# Patient Record
Sex: FEMALE | Race: BLACK OR AFRICAN AMERICAN | Employment: OTHER | URBAN - METROPOLITAN AREA
[De-identification: names, ages, dates, MRNs, and addresses within clinical notes are randomized per-mention and may not be internally consistent; named-entity substitution may affect disease eponyms.]

---

## 2017-06-20 ENCOUNTER — HISTORICAL (OUTPATIENT)
Dept: ADMINISTRATIVE | Facility: HOSPITAL | Age: 62
End: 2017-06-20

## 2017-06-20 LAB
ABS NEUT (OLG): 2.91 X10(3)/MCL (ref 2.1–9.2)
ALBUMIN SERPL-MCNC: 4 GM/DL (ref 3.4–5)
ALBUMIN/GLOB SERPL: 1 {RATIO}
ALP SERPL-CCNC: 66 UNIT/L (ref 20–120)
ALT SERPL-CCNC: 16 UNIT/L
AST SERPL-CCNC: 19 UNIT/L
BASOPHILS # BLD AUTO: 0.04 X10(3)/MCL
BASOPHILS NFR BLD AUTO: 1 % (ref 0–1)
BILIRUB SERPL-MCNC: 1 MG/DL
BILIRUBIN DIRECT+TOT PNL SERPL-MCNC: 0.2 MG/DL
BILIRUBIN DIRECT+TOT PNL SERPL-MCNC: 0.8 MG/DL
BUN SERPL-MCNC: 19 MG/DL (ref 7–25)
CALCIUM SERPL-MCNC: 9.3 MG/DL (ref 8.4–10.3)
CEA SERPL-MCNC: 1.1 NG/ML
CHLORIDE SERPL-SCNC: 111 MMOL/L (ref 96–110)
CO2 SERPL-SCNC: 25 MMOL/L (ref 24–32)
CREAT SERPL-MCNC: 0.68 MG/DL (ref 0.7–1.1)
EOSINOPHIL # BLD AUTO: 0.11 X10(3)/MCL
EOSINOPHIL NFR BLD AUTO: 2 % (ref 0–5)
ERYTHROCYTE [DISTWIDTH] IN BLOOD BY AUTOMATED COUNT: 12.4 % (ref 11.5–14.5)
GLOBULIN SER-MCNC: 3.8 GM/ML (ref 2.3–3.5)
GLUCOSE SERPL-MCNC: 104 MG/DL (ref 65–99)
HCT VFR BLD AUTO: 41.8 % (ref 35–46)
HGB BLD-MCNC: 13.7 GM/DL (ref 12–16)
IMM GRANULOCYTES # BLD AUTO: 0.01 10*3/UL
IMM GRANULOCYTES NFR BLD AUTO: 0 %
LYMPHOCYTES # BLD AUTO: 1.92 X10(3)/MCL
LYMPHOCYTES NFR BLD AUTO: 36 % (ref 15–40)
MCH RBC QN AUTO: 29.2 PG (ref 26–34)
MCHC RBC AUTO-ENTMCNC: 32.8 GM/DL (ref 31–37)
MCV RBC AUTO: 89.1 FL (ref 80–100)
MONOCYTES # BLD AUTO: 0.34 X10(3)/MCL
MONOCYTES NFR BLD AUTO: 6 % (ref 4–12)
NEUTROPHILS # BLD AUTO: 2.91 X10(3)/MCL
NEUTROPHILS NFR BLD AUTO: 54 X10(3)/MCL
PLATELET # BLD AUTO: 191 X10(3)/MCL (ref 130–400)
PMV BLD AUTO: 9.3 FL (ref 7.4–10.4)
POTASSIUM SERPL-SCNC: 4.4 MMOL/L (ref 3.6–5.2)
PROT SERPL-MCNC: 7.8 GM/DL (ref 6–8)
RBC # BLD AUTO: 4.69 X10(6)/MCL (ref 4–5.2)
SODIUM SERPL-SCNC: 141 MMOL/L (ref 135–146)
WBC # SPEC AUTO: 5.3 X10(3)/MCL (ref 4.5–11)

## 2017-07-18 ENCOUNTER — HISTORICAL (OUTPATIENT)
Dept: ENDOSCOPY | Facility: HOSPITAL | Age: 62
End: 2017-07-18

## 2017-08-01 ENCOUNTER — HISTORICAL (OUTPATIENT)
Dept: RADIOLOGY | Facility: HOSPITAL | Age: 62
End: 2017-08-01

## 2017-09-19 ENCOUNTER — HISTORICAL (OUTPATIENT)
Dept: HEMATOLOGY/ONCOLOGY | Facility: CLINIC | Age: 62
End: 2017-09-19

## 2017-09-26 ENCOUNTER — HISTORICAL (OUTPATIENT)
Dept: ADMINISTRATIVE | Facility: HOSPITAL | Age: 62
End: 2017-09-26

## 2017-09-26 LAB
ABS NEUT (OLG): 3.92 X10(3)/MCL (ref 2.1–9.2)
ALBUMIN SERPL-MCNC: 3.7 GM/DL (ref 3.4–5)
ALBUMIN/GLOB SERPL: 1 RATIO (ref 1–2)
ALP SERPL-CCNC: 78 UNIT/L (ref 45–117)
ALT SERPL-CCNC: 23 UNIT/L (ref 12–78)
AST SERPL-CCNC: 13 UNIT/L (ref 15–37)
BASOPHILS # BLD AUTO: 0.04 X10(3)/MCL
BASOPHILS NFR BLD AUTO: 1 % (ref 0–1)
BILIRUB SERPL-MCNC: 0.6 MG/DL (ref 0.2–1)
BILIRUBIN DIRECT+TOT PNL SERPL-MCNC: 0.1 MG/DL
BILIRUBIN DIRECT+TOT PNL SERPL-MCNC: 0.5 MG/DL
BUN SERPL-MCNC: 13 MG/DL (ref 7–18)
CALCIUM SERPL-MCNC: 9.2 MG/DL (ref 8.5–10.1)
CEA SERPL-MCNC: 1.5 NG/ML
CHLORIDE SERPL-SCNC: 108 MMOL/L (ref 98–107)
CO2 SERPL-SCNC: 30 MMOL/L (ref 21–32)
CREAT SERPL-MCNC: 0.9 MG/DL (ref 0.6–1.3)
EOSINOPHIL # BLD AUTO: 0.12 X10(3)/MCL
EOSINOPHIL NFR BLD AUTO: 2 % (ref 0–5)
ERYTHROCYTE [DISTWIDTH] IN BLOOD BY AUTOMATED COUNT: 12.5 % (ref 11.5–14.5)
GLOBULIN SER-MCNC: 4.2 GM/ML (ref 2.3–3.5)
GLUCOSE SERPL-MCNC: 122 MG/DL (ref 74–106)
HCT VFR BLD AUTO: 42.5 % (ref 35–46)
HGB BLD-MCNC: 14 GM/DL (ref 12–16)
LYMPHOCYTES # BLD AUTO: 2.03 X10(3)/MCL
LYMPHOCYTES NFR BLD AUTO: 32 % (ref 15–40)
MCH RBC QN AUTO: 30.1 PG (ref 26–34)
MCHC RBC AUTO-ENTMCNC: 32.9 GM/DL (ref 31–37)
MCV RBC AUTO: 91.4 FL (ref 80–100)
MONOCYTES # BLD AUTO: 0.33 X10(3)/MCL
MONOCYTES NFR BLD AUTO: 5 % (ref 4–12)
NEUTROPHILS # BLD AUTO: 3.92 X10(3)/MCL
NEUTROPHILS NFR BLD AUTO: 61 X10(3)/MCL
PLATELET # BLD AUTO: 198 X10(3)/MCL (ref 130–400)
PMV BLD AUTO: 9.5 FL (ref 7.4–10.4)
POTASSIUM SERPL-SCNC: 4 MMOL/L (ref 3.5–5.1)
PROT SERPL-MCNC: 7.9 GM/DL (ref 6.4–8.2)
RBC # BLD AUTO: 4.65 X10(6)/MCL (ref 4–5.2)
SODIUM SERPL-SCNC: 142 MMOL/L (ref 136–145)
WBC # SPEC AUTO: 6.4 X10(3)/MCL (ref 4.5–11)

## 2018-06-15 ENCOUNTER — HISTORICAL (OUTPATIENT)
Dept: ADMINISTRATIVE | Facility: HOSPITAL | Age: 63
End: 2018-06-15

## 2018-06-15 ENCOUNTER — HISTORICAL (OUTPATIENT)
Dept: INTERNAL MEDICINE | Facility: CLINIC | Age: 63
End: 2018-06-15

## 2018-06-15 LAB
ABS NEUT (OLG): 3.7 X10(3)/MCL (ref 2.1–9.2)
ALBUMIN SERPL-MCNC: 3.9 GM/DL (ref 3.4–5)
ALBUMIN/GLOB SERPL: 1 RATIO (ref 1–2)
ALP SERPL-CCNC: 90 UNIT/L (ref 45–117)
ALT SERPL-CCNC: 28 UNIT/L (ref 12–78)
APPEARANCE, UA: CLEAR
AST SERPL-CCNC: 21 UNIT/L (ref 15–37)
BACTERIA #/AREA URNS AUTO: ABNORMAL /[HPF]
BASOPHILS # BLD AUTO: 0.04 X10(3)/MCL
BASOPHILS NFR BLD AUTO: 1 %
BILIRUB SERPL-MCNC: 0.4 MG/DL (ref 0.2–1)
BILIRUB UR QL STRIP: NEGATIVE
BILIRUBIN DIRECT+TOT PNL SERPL-MCNC: <0.1 MG/DL
BILIRUBIN DIRECT+TOT PNL SERPL-MCNC: >0.3 MG/DL
BUN SERPL-MCNC: 19 MG/DL (ref 7–18)
CALCIUM SERPL-MCNC: 9.2 MG/DL (ref 8.5–10.1)
CHLORIDE SERPL-SCNC: 110 MMOL/L (ref 98–107)
CHOLEST SERPL-MCNC: 233 MG/DL
CHOLEST/HDLC SERPL: 8.3 {RATIO} (ref 0–4.4)
CO2 SERPL-SCNC: 28 MMOL/L (ref 21–32)
COLOR UR: YELLOW
CREAT SERPL-MCNC: 0.8 MG/DL (ref 0.6–1.3)
EOSINOPHIL # BLD AUTO: 0.1 X10(3)/MCL
EOSINOPHIL NFR BLD AUTO: 2 %
ERYTHROCYTE [DISTWIDTH] IN BLOOD BY AUTOMATED COUNT: 12.3 % (ref 11.5–14.5)
EST. AVERAGE GLUCOSE BLD GHB EST-MCNC: 148 MG/DL
GLOBULIN SER-MCNC: 4.4 GM/ML (ref 2.3–3.5)
GLUCOSE (UA): NORMAL
GLUCOSE SERPL-MCNC: 98 MG/DL (ref 74–106)
HAV IGM SERPL QL IA: NONREACTIVE
HBA1C MFR BLD: 6.8 % (ref 4.2–6.3)
HBV CORE IGM SERPL QL IA: NONREACTIVE
HBV SURFACE AG SERPL QL IA: NEGATIVE
HCT VFR BLD AUTO: 44.3 % (ref 35–46)
HCV AB SERPL QL IA: NONREACTIVE
HDLC SERPL-MCNC: 28 MG/DL
HGB BLD-MCNC: 14.6 GM/DL (ref 12–16)
HGB UR QL STRIP: 0.03 MG/DL
HIV 1+2 AB+HIV1 P24 AG SERPL QL IA: NONREACTIVE
HYALINE CASTS #/AREA URNS LPF: ABNORMAL /[LPF]
KETONES UR QL STRIP: NEGATIVE
LDLC SERPL CALC-MCNC: ABNORMAL MG/DL (ref 0–130)
LEUKOCYTE ESTERASE UR QL STRIP: 500 LEU/UL
LYMPHOCYTES # BLD AUTO: 2.49 X10(3)/MCL
LYMPHOCYTES NFR BLD AUTO: 37 % (ref 13–40)
MCH RBC QN AUTO: 30.4 PG (ref 26–34)
MCHC RBC AUTO-ENTMCNC: 33 GM/DL (ref 31–37)
MCV RBC AUTO: 92.3 FL (ref 80–100)
MONOCYTES # BLD AUTO: 0.44 X10(3)/MCL
MONOCYTES NFR BLD AUTO: 6 % (ref 4–12)
NEUTROPHILS # BLD AUTO: 3.7 X10(3)/MCL
NEUTROPHILS NFR BLD AUTO: 55 X10(3)/MCL
NITRITE UR QL STRIP: NEGATIVE
PH UR STRIP: 5.5 [PH] (ref 4.5–8)
PLATELET # BLD AUTO: 194 X10(3)/MCL (ref 130–400)
PMV BLD AUTO: 9.3 FL (ref 7.4–10.4)
POTASSIUM SERPL-SCNC: 4.2 MMOL/L (ref 3.5–5.1)
PROT SERPL-MCNC: 8.3 GM/DL (ref 6.4–8.2)
PROT UR QL STRIP: 10 MG/DL
RBC # BLD AUTO: 4.8 X10(6)/MCL (ref 4–5.2)
RBC #/AREA URNS AUTO: ABNORMAL /[HPF]
SODIUM SERPL-SCNC: 143 MMOL/L (ref 136–145)
SP GR UR STRIP: 1.03 (ref 1–1.03)
SQUAMOUS #/AREA URNS LPF: ABNORMAL /[LPF]
TRIGL SERPL-MCNC: 466 MG/DL
TSH SERPL-ACNC: 0.42 MIU/L (ref 0.36–3.74)
UROBILINOGEN UR STRIP-ACNC: 2 MG/DL
VLDLC SERPL CALC-MCNC: ABNORMAL MG/DL
WBC # SPEC AUTO: 6.8 X10(3)/MCL (ref 4.5–11)
WBC #/AREA URNS AUTO: ABNORMAL /HPF

## 2018-06-29 ENCOUNTER — HISTORICAL (OUTPATIENT)
Dept: INTERNAL MEDICINE | Facility: CLINIC | Age: 63
End: 2018-06-29

## 2018-06-29 LAB
ALBUMIN SERPL-MCNC: 4 GM/DL (ref 3.4–5)
ALBUMIN/GLOB SERPL: 1 RATIO (ref 1–2)
ALP SERPL-CCNC: 82 UNIT/L (ref 45–117)
ALT SERPL-CCNC: 29 UNIT/L (ref 12–78)
APPEARANCE, UA: ABNORMAL
AST SERPL-CCNC: 21 UNIT/L (ref 15–37)
BACTERIA #/AREA URNS AUTO: ABNORMAL /[HPF]
BILIRUB SERPL-MCNC: 0.8 MG/DL (ref 0.2–1)
BILIRUB UR QL STRIP: NEGATIVE
BILIRUBIN DIRECT+TOT PNL SERPL-MCNC: 0.2 MG/DL
BILIRUBIN DIRECT+TOT PNL SERPL-MCNC: 0.6 MG/DL
BUN SERPL-MCNC: 14 MG/DL (ref 7–18)
CALCIUM SERPL-MCNC: 9.6 MG/DL (ref 8.5–10.1)
CHLORIDE SERPL-SCNC: 108 MMOL/L (ref 98–107)
CO2 SERPL-SCNC: 29 MMOL/L (ref 21–32)
COLOR UR: YELLOW
CREAT SERPL-MCNC: 0.8 MG/DL (ref 0.6–1.3)
CREAT UR-MCNC: 230 MG/DL
EST. AVERAGE GLUCOSE BLD GHB EST-MCNC: 108 MG/DL
GLOBULIN SER-MCNC: 4.6 GM/ML (ref 2.3–3.5)
GLUCOSE (UA): NORMAL
GLUCOSE SERPL-MCNC: 93 MG/DL (ref 74–106)
HBA1C MFR BLD: 5.4 % (ref 4.2–6.3)
HGB UR QL STRIP: NEGATIVE
HYALINE CASTS #/AREA URNS LPF: ABNORMAL /[LPF]
KETONES UR QL STRIP: NEGATIVE
LEUKOCYTE ESTERASE UR QL STRIP: 500 LEU/UL
MICROALBUMIN UR-MCNC: 16 MG/L (ref 0–19)
MICROALBUMIN/CREAT RATIO PNL UR: 7 MCG/MG CR (ref 0–29)
NITRITE UR QL STRIP: NEGATIVE
PH UR STRIP: 5.5 [PH] (ref 4.5–8)
POTASSIUM SERPL-SCNC: 4.2 MMOL/L (ref 3.5–5.1)
PROT SERPL-MCNC: 8.6 GM/DL (ref 6.4–8.2)
PROT UR QL STRIP: 10 MG/DL
RBC #/AREA URNS AUTO: ABNORMAL /[HPF]
SODIUM SERPL-SCNC: 141 MMOL/L (ref 136–145)
SP GR UR STRIP: 1.03 (ref 1–1.03)
SQUAMOUS #/AREA URNS LPF: >100 /[LPF]
UROBILINOGEN UR STRIP-ACNC: NORMAL
WBC #/AREA URNS AUTO: ABNORMAL /HPF

## 2018-10-23 ENCOUNTER — HISTORICAL (OUTPATIENT)
Dept: RADIOLOGY | Facility: HOSPITAL | Age: 63
End: 2018-10-23

## 2018-12-12 ENCOUNTER — HISTORICAL (OUTPATIENT)
Dept: ADMINISTRATIVE | Facility: HOSPITAL | Age: 63
End: 2018-12-12

## 2018-12-12 LAB
ALBUMIN SERPL-MCNC: 4 GM/DL (ref 3.4–5)
ALBUMIN/GLOB SERPL: 1 RATIO (ref 1–2)
ALP SERPL-CCNC: 78 UNIT/L (ref 45–117)
ALT SERPL-CCNC: 26 UNIT/L (ref 12–78)
AST SERPL-CCNC: 16 UNIT/L (ref 15–37)
BILIRUB SERPL-MCNC: 0.9 MG/DL (ref 0.2–1)
BILIRUBIN DIRECT+TOT PNL SERPL-MCNC: 0.2 MG/DL
BILIRUBIN DIRECT+TOT PNL SERPL-MCNC: 0.7 MG/DL
BUN SERPL-MCNC: 16 MG/DL (ref 7–18)
CALCIUM SERPL-MCNC: 9.4 MG/DL (ref 8.5–10.1)
CHLORIDE SERPL-SCNC: 108 MMOL/L (ref 98–107)
CHOLEST SERPL-MCNC: 251 MG/DL
CHOLEST/HDLC SERPL: 6.1 {RATIO} (ref 0–4.4)
CO2 SERPL-SCNC: 29 MMOL/L (ref 21–32)
CREAT SERPL-MCNC: 0.7 MG/DL (ref 0.6–1.3)
EST. AVERAGE GLUCOSE BLD GHB EST-MCNC: 105 MG/DL
GLOBULIN SER-MCNC: 4.6 GM/ML (ref 2.3–3.5)
GLUCOSE SERPL-MCNC: 77 MG/DL (ref 74–106)
HBA1C MFR BLD: 5.3 % (ref 4.2–6.3)
HDLC SERPL-MCNC: 41 MG/DL
LDLC SERPL CALC-MCNC: 185 MG/DL (ref 0–130)
POTASSIUM SERPL-SCNC: 4.2 MMOL/L (ref 3.5–5.1)
PROT SERPL-MCNC: 8.6 GM/DL (ref 6.4–8.2)
SODIUM SERPL-SCNC: 140 MMOL/L (ref 136–145)
TRIGL SERPL-MCNC: 125 MG/DL
VLDLC SERPL CALC-MCNC: 25 MG/DL

## 2019-01-03 ENCOUNTER — HISTORICAL (OUTPATIENT)
Dept: ADMINISTRATIVE | Facility: HOSPITAL | Age: 64
End: 2019-01-03

## 2019-02-19 ENCOUNTER — HISTORICAL (OUTPATIENT)
Dept: RADIOLOGY | Facility: HOSPITAL | Age: 64
End: 2019-02-19

## 2019-03-21 ENCOUNTER — HISTORICAL (OUTPATIENT)
Dept: INTERNAL MEDICINE | Facility: CLINIC | Age: 64
End: 2019-03-21

## 2019-03-21 LAB
ABS NEUT (OLG): 2.42 X10(3)/MCL (ref 2.1–9.2)
BASOPHILS # BLD AUTO: 0.03 X10(3)/MCL
BASOPHILS NFR BLD AUTO: 1 %
BUN SERPL-MCNC: 16 MG/DL (ref 7–18)
CALCIUM SERPL-MCNC: 9.4 MG/DL (ref 8.5–10.1)
CHLORIDE SERPL-SCNC: 109 MMOL/L (ref 98–107)
CHOLEST SERPL-MCNC: 239 MG/DL
CHOLEST/HDLC SERPL: 5.8 {RATIO} (ref 0–4.4)
CO2 SERPL-SCNC: 30 MMOL/L (ref 21–32)
CREAT SERPL-MCNC: 0.7 MG/DL (ref 0.6–1.3)
CREAT/UREA NIT SERPL: 23
EOSINOPHIL # BLD AUTO: 0.1 X10(3)/MCL
EOSINOPHIL NFR BLD AUTO: 2 %
ERYTHROCYTE [DISTWIDTH] IN BLOOD BY AUTOMATED COUNT: 12.2 % (ref 11.5–14.5)
GLUCOSE SERPL-MCNC: 96 MG/DL (ref 74–106)
HCT VFR BLD AUTO: 44.9 % (ref 35–46)
HDLC SERPL-MCNC: 41 MG/DL
HGB BLD-MCNC: 14.6 GM/DL (ref 12–16)
IMM GRANULOCYTES # BLD AUTO: 0.01 10*3/UL
IMM GRANULOCYTES NFR BLD AUTO: 0 %
LDLC SERPL CALC-MCNC: 177 MG/DL (ref 0–130)
LYMPHOCYTES # BLD AUTO: 2.38 X10(3)/MCL
LYMPHOCYTES NFR BLD AUTO: 45 % (ref 13–40)
MCH RBC QN AUTO: 30.4 PG (ref 26–34)
MCHC RBC AUTO-ENTMCNC: 32.5 GM/DL (ref 31–37)
MCV RBC AUTO: 93.5 FL (ref 80–100)
MONOCYTES # BLD AUTO: 0.38 X10(3)/MCL
MONOCYTES NFR BLD AUTO: 7 % (ref 4–12)
NEUTROPHILS # BLD AUTO: 2.42 X10(3)/MCL
NEUTROPHILS NFR BLD AUTO: 46 X10(3)/MCL
PLATELET # BLD AUTO: 180 X10(3)/MCL (ref 130–400)
PMV BLD AUTO: 8.7 FL (ref 7.4–10.4)
POTASSIUM SERPL-SCNC: 4.4 MMOL/L (ref 3.5–5.1)
RBC # BLD AUTO: 4.8 X10(6)/MCL (ref 4–5.2)
SODIUM SERPL-SCNC: 139 MMOL/L (ref 136–145)
TRIGL SERPL-MCNC: 106 MG/DL
VLDLC SERPL CALC-MCNC: 21 MG/DL
WBC # SPEC AUTO: 5.3 X10(3)/MCL (ref 4.5–11)

## 2019-05-17 ENCOUNTER — HISTORICAL (OUTPATIENT)
Dept: RADIOLOGY | Facility: HOSPITAL | Age: 64
End: 2019-05-17

## 2019-05-17 LAB
BUN SERPL-MCNC: 19 MG/DL (ref 7–18)
CALCIUM SERPL-MCNC: 9.1 MG/DL (ref 8.5–10.1)
CHLORIDE SERPL-SCNC: 112 MMOL/L (ref 98–107)
CO2 SERPL-SCNC: 28 MMOL/L (ref 21–32)
CREAT SERPL-MCNC: 0.6 MG/DL (ref 0.6–1.3)
CREAT/UREA NIT SERPL: 32
GLUCOSE SERPL-MCNC: 94 MG/DL (ref 74–106)
POTASSIUM SERPL-SCNC: 4.2 MMOL/L (ref 3.5–5.1)
SODIUM SERPL-SCNC: 143 MMOL/L (ref 136–145)

## 2019-10-16 ENCOUNTER — HISTORICAL (OUTPATIENT)
Dept: ADMINISTRATIVE | Facility: HOSPITAL | Age: 64
End: 2019-10-16

## 2019-10-29 ENCOUNTER — HISTORICAL (OUTPATIENT)
Dept: RADIOLOGY | Facility: HOSPITAL | Age: 64
End: 2019-10-29

## 2020-01-21 ENCOUNTER — HISTORICAL (OUTPATIENT)
Dept: ADMINISTRATIVE | Facility: HOSPITAL | Age: 65
End: 2020-01-21

## 2020-03-17 ENCOUNTER — HISTORICAL (OUTPATIENT)
Dept: RADIOLOGY | Facility: HOSPITAL | Age: 65
End: 2020-03-17

## 2020-03-24 ENCOUNTER — HISTORICAL (OUTPATIENT)
Dept: ADMINISTRATIVE | Facility: HOSPITAL | Age: 65
End: 2020-03-24

## 2020-03-24 LAB
FLUAV AG UPPER RESP QL IA.RAPID: NEGATIVE
FLUBV AG UPPER RESP QL IA.RAPID: NEGATIVE

## 2020-04-29 ENCOUNTER — HISTORICAL (OUTPATIENT)
Dept: RADIOLOGY | Facility: HOSPITAL | Age: 65
End: 2020-04-29

## 2020-05-19 ENCOUNTER — HISTORICAL (OUTPATIENT)
Dept: INTERNAL MEDICINE | Facility: CLINIC | Age: 65
End: 2020-05-19

## 2020-05-19 LAB
ABS NEUT (OLG): 3.34 X10(3)/MCL (ref 2.1–9.2)
ALBUMIN SERPL-MCNC: 3.7 GM/DL (ref 3.4–5)
ALBUMIN/GLOB SERPL: 0.8 RATIO (ref 1.1–2)
ALP SERPL-CCNC: 76 UNIT/L (ref 45–117)
ALT SERPL-CCNC: 24 UNIT/L (ref 12–78)
APPEARANCE, UA: ABNORMAL
AST SERPL-CCNC: 18 UNIT/L (ref 15–37)
BACTERIA #/AREA URNS AUTO: ABNORMAL /HPF
BASOPHILS # BLD AUTO: 0 X10(3)/MCL (ref 0–0.2)
BASOPHILS NFR BLD AUTO: 1 %
BILIRUB SERPL-MCNC: 0.4 MG/DL (ref 0.2–1)
BILIRUB UR QL STRIP: NEGATIVE
BILIRUBIN DIRECT+TOT PNL SERPL-MCNC: 0.1 MG/DL (ref 0–0.2)
BILIRUBIN DIRECT+TOT PNL SERPL-MCNC: 0.3 MG/DL
BUN SERPL-MCNC: 13 MG/DL (ref 7–18)
CALCIUM SERPL-MCNC: 9.4 MG/DL (ref 8.5–10.1)
CHLORIDE SERPL-SCNC: 109 MMOL/L (ref 98–107)
CHOLEST SERPL-MCNC: 238 MG/DL
CHOLEST/HDLC SERPL: 8.2 {RATIO} (ref 0–4.4)
CO2 SERPL-SCNC: 27 MMOL/L (ref 21–32)
COLOR UR: ABNORMAL
CREAT SERPL-MCNC: 0.8 MG/DL (ref 0.6–1.3)
CREAT UR-MCNC: 94 MG/DL
EOSINOPHIL # BLD AUTO: 0.2 X10(3)/MCL (ref 0–0.9)
EOSINOPHIL NFR BLD AUTO: 3 %
ERYTHROCYTE [DISTWIDTH] IN BLOOD BY AUTOMATED COUNT: 12.3 % (ref 11.5–14.5)
EST. AVERAGE GLUCOSE BLD GHB EST-MCNC: 114 MG/DL
GLOBULIN SER-MCNC: 4.7 GM/ML (ref 2.3–3.5)
GLUCOSE (UA): NEGATIVE
GLUCOSE SERPL-MCNC: 98 MG/DL (ref 74–106)
HAV IGM SERPL QL IA: NONREACTIVE
HBA1C MFR BLD: 5.6 % (ref 4.2–6.3)
HBV CORE IGM SERPL QL IA: NONREACTIVE
HBV SURFACE AG SERPL QL IA: NONREACTIVE
HCT VFR BLD AUTO: 44.2 % (ref 35–46)
HCV AB SERPL QL IA: NONREACTIVE
HDLC SERPL-MCNC: 29 MG/DL (ref 40–59)
HGB BLD-MCNC: 14.2 GM/DL (ref 12–16)
HGB UR QL STRIP: NEGATIVE
HIV 1+2 AB+HIV1 P24 AG SERPL QL IA: NONREACTIVE
HYALINE CASTS #/AREA URNS LPF: ABNORMAL /LPF
IMM GRANULOCYTES # BLD AUTO: 0.02 10*3/UL
IMM GRANULOCYTES NFR BLD AUTO: 0 %
KETONES UR QL STRIP: NEGATIVE
LDLC SERPL CALC-MCNC: 177 MG/DL
LEUKOCYTE ESTERASE UR QL STRIP: 500 LEU/UL
LYMPHOCYTES # BLD AUTO: 2.8 X10(3)/MCL (ref 0.6–4.6)
LYMPHOCYTES NFR BLD AUTO: 41 %
MCH RBC QN AUTO: 29.5 PG (ref 26–34)
MCHC RBC AUTO-ENTMCNC: 32.1 GM/DL (ref 31–37)
MCV RBC AUTO: 91.9 FL (ref 80–100)
MICROALBUMIN UR-MCNC: 10.9 MG/L (ref 0–19)
MICROALBUMIN/CREAT RATIO PNL UR: 11.6 MCG/MG CR (ref 0–29)
MONOCYTES # BLD AUTO: 0.4 X10(3)/MCL (ref 0.1–1.3)
MONOCYTES NFR BLD AUTO: 6 %
NEUTROPHILS # BLD AUTO: 3.34 X10(3)/MCL (ref 2.1–9.2)
NEUTROPHILS NFR BLD AUTO: 49 %
NITRITE UR QL STRIP: NEGATIVE
PH UR STRIP: 6 [PH] (ref 4.5–8)
PLATELET # BLD AUTO: 188 X10(3)/MCL (ref 130–400)
PMV BLD AUTO: 9.2 FL (ref 7.4–10.4)
POTASSIUM SERPL-SCNC: 4.6 MMOL/L (ref 3.5–5.1)
PROT SERPL-MCNC: 8.4 GM/DL (ref 6.4–8.2)
PROT UR QL STRIP: NEGATIVE
RBC # BLD AUTO: 4.81 X10(6)/MCL (ref 4–5.2)
RBC #/AREA URNS AUTO: ABNORMAL /HPF
SODIUM SERPL-SCNC: 141 MMOL/L (ref 136–145)
SP GR UR STRIP: 1.01 (ref 1–1.03)
SQUAMOUS #/AREA URNS LPF: ABNORMAL /LPF
TRIGL SERPL-MCNC: 162 MG/DL
TSH SERPL-ACNC: 0.55 MIU/L (ref 0.36–3.74)
UROBILINOGEN UR STRIP-ACNC: NORMAL
VLDLC SERPL CALC-MCNC: 32 MG/DL
WBC # SPEC AUTO: 6.8 X10(3)/MCL (ref 4.5–11)
WBC #/AREA URNS AUTO: >=100 /HPF

## 2020-05-21 ENCOUNTER — HISTORICAL (OUTPATIENT)
Dept: RADIOLOGY | Facility: HOSPITAL | Age: 65
End: 2020-05-21

## 2020-05-27 ENCOUNTER — HISTORICAL (OUTPATIENT)
Dept: CARDIOLOGY | Facility: HOSPITAL | Age: 65
End: 2020-05-27

## 2020-05-27 LAB
APTT PPP: 27.7 SECOND(S) (ref 23.3–37)
INR PPP: 0.96 (ref 0.9–1.2)
PROTHROMBIN TIME: 12.4 SECOND(S) (ref 11.9–14.4)

## 2020-06-02 ENCOUNTER — HISTORICAL (OUTPATIENT)
Dept: RADIOLOGY | Facility: HOSPITAL | Age: 65
End: 2020-06-02

## 2020-06-10 ENCOUNTER — HISTORICAL (OUTPATIENT)
Dept: RESPIRATORY THERAPY | Facility: HOSPITAL | Age: 65
End: 2020-06-10

## 2020-06-10 LAB
APPEARANCE, UA: CLEAR
BACTERIA #/AREA URNS AUTO: ABNORMAL /HPF
BILIRUB UR QL STRIP: NEGATIVE
COLOR UR: ABNORMAL
GLUCOSE (UA): NEGATIVE
HGB UR QL STRIP: NEGATIVE
HYALINE CASTS #/AREA URNS LPF: ABNORMAL /LPF
KETONES UR QL STRIP: NEGATIVE
LEUKOCYTE ESTERASE UR QL STRIP: 250 LEU/UL
NITRITE UR QL STRIP: NEGATIVE
PH UR STRIP: 7 [PH] (ref 4.5–8)
PROT UR QL STRIP: NEGATIVE
RBC #/AREA URNS AUTO: ABNORMAL /HPF
SP GR UR STRIP: 1 (ref 1–1.03)
SQUAMOUS #/AREA URNS LPF: ABNORMAL /LPF
UROBILINOGEN UR STRIP-ACNC: NORMAL
WBC #/AREA URNS AUTO: ABNORMAL /HPF

## 2020-06-12 LAB — FINAL CULTURE: NORMAL

## 2020-06-19 ENCOUNTER — HISTORICAL (OUTPATIENT)
Dept: RADIOLOGY | Facility: HOSPITAL | Age: 65
End: 2020-06-19

## 2020-07-20 ENCOUNTER — HISTORICAL (OUTPATIENT)
Dept: HEMATOLOGY/ONCOLOGY | Facility: CLINIC | Age: 65
End: 2020-07-20

## 2020-07-20 LAB
ABS NEUT (OLG): 4.5 X10(3)/MCL (ref 2.1–9.2)
ALBUMIN SERPL-MCNC: 3.6 GM/DL (ref 3.4–5)
ALBUMIN/GLOB SERPL: 0.9 RATIO (ref 1.1–2)
ALP SERPL-CCNC: 71 UNIT/L (ref 45–117)
ALT SERPL-CCNC: 25 UNIT/L (ref 12–78)
AST SERPL-CCNC: 11 UNIT/L (ref 15–37)
BASOPHILS # BLD AUTO: 0 X10(3)/MCL (ref 0–0.2)
BASOPHILS NFR BLD AUTO: 0 %
BILIRUB SERPL-MCNC: 0.7 MG/DL (ref 0.2–1)
BILIRUBIN DIRECT+TOT PNL SERPL-MCNC: 0.2 MG/DL (ref 0–0.2)
BILIRUBIN DIRECT+TOT PNL SERPL-MCNC: 0.5 MG/DL
BUN SERPL-MCNC: 16 MG/DL (ref 7–18)
CALCIUM SERPL-MCNC: 9.2 MG/DL (ref 8.5–10.1)
CHLORIDE SERPL-SCNC: 110 MMOL/L (ref 98–107)
CO2 SERPL-SCNC: 30 MMOL/L (ref 21–32)
CREAT SERPL-MCNC: 0.8 MG/DL (ref 0.6–1.3)
EOSINOPHIL # BLD AUTO: 0.1 X10(3)/MCL (ref 0–0.9)
EOSINOPHIL NFR BLD AUTO: 2 %
ERYTHROCYTE [DISTWIDTH] IN BLOOD BY AUTOMATED COUNT: 12.1 % (ref 11.5–14.5)
GLOBULIN SER-MCNC: 4.2 GM/ML (ref 2.3–3.5)
GLUCOSE SERPL-MCNC: 94 MG/DL (ref 74–106)
HCT VFR BLD AUTO: 42.4 % (ref 35–46)
HGB BLD-MCNC: 13.6 GM/DL (ref 12–16)
IMM GRANULOCYTES # BLD AUTO: 0.04 10*3/UL
IMM GRANULOCYTES NFR BLD AUTO: 0 %
LYMPHOCYTES # BLD AUTO: 3.2 X10(3)/MCL (ref 0.6–4.6)
LYMPHOCYTES NFR BLD AUTO: 38 %
MCH RBC QN AUTO: 29.7 PG (ref 26–34)
MCHC RBC AUTO-ENTMCNC: 32.1 GM/DL (ref 31–37)
MCV RBC AUTO: 92.6 FL (ref 80–100)
MONOCYTES # BLD AUTO: 0.6 X10(3)/MCL (ref 0.1–1.3)
MONOCYTES NFR BLD AUTO: 7 %
NEUTROPHILS # BLD AUTO: 4.5 X10(3)/MCL (ref 2.1–9.2)
NEUTROPHILS NFR BLD AUTO: 53 %
PLATELET # BLD AUTO: 182 X10(3)/MCL (ref 130–400)
PMV BLD AUTO: 9 FL (ref 7.4–10.4)
POTASSIUM SERPL-SCNC: 4.2 MMOL/L (ref 3.5–5.1)
PROT SERPL-MCNC: 7.8 GM/DL (ref 6.4–8.2)
RBC # BLD AUTO: 4.58 X10(6)/MCL (ref 4–5.2)
SODIUM SERPL-SCNC: 141 MMOL/L (ref 136–145)
WBC # SPEC AUTO: 8.5 X10(3)/MCL (ref 4.5–11)

## 2020-08-10 ENCOUNTER — HISTORICAL (OUTPATIENT)
Dept: ADMINISTRATIVE | Facility: HOSPITAL | Age: 65
End: 2020-08-10

## 2020-08-24 ENCOUNTER — HISTORICAL (OUTPATIENT)
Dept: HEMATOLOGY/ONCOLOGY | Facility: CLINIC | Age: 65
End: 2020-08-24

## 2020-08-24 LAB — CANCER AG125 SERPL-ACNC: 22.2 UNIT/ML (ref 0–35)

## 2020-09-08 LAB — FINAL CULTURE: NORMAL

## 2020-09-09 ENCOUNTER — HISTORICAL (OUTPATIENT)
Dept: RADIOLOGY | Facility: HOSPITAL | Age: 65
End: 2020-09-09

## 2020-10-01 ENCOUNTER — HISTORICAL (OUTPATIENT)
Dept: RADIOLOGY | Facility: HOSPITAL | Age: 65
End: 2020-10-01

## 2020-10-27 ENCOUNTER — HISTORICAL (OUTPATIENT)
Dept: INTERNAL MEDICINE | Facility: CLINIC | Age: 65
End: 2020-10-27

## 2020-11-03 ENCOUNTER — HISTORICAL (OUTPATIENT)
Dept: HEMATOLOGY/ONCOLOGY | Facility: CLINIC | Age: 65
End: 2020-11-03

## 2020-11-03 LAB
ABS NEUT (OLG): 3.38 X10(3)/MCL (ref 2.1–9.2)
ALBUMIN SERPL-MCNC: 4 GM/DL (ref 3.4–4.8)
ALBUMIN/GLOB SERPL: 0.9 RATIO (ref 1.1–2)
ALP SERPL-CCNC: 76 UNIT/L (ref 40–150)
ALT SERPL-CCNC: 18 UNIT/L (ref 0–55)
AST SERPL-CCNC: 18 UNIT/L (ref 5–34)
BASOPHILS # BLD AUTO: 0 X10(3)/MCL (ref 0–0.2)
BASOPHILS NFR BLD AUTO: 1 %
BILIRUB SERPL-MCNC: 0.6 MG/DL
BILIRUBIN DIRECT+TOT PNL SERPL-MCNC: 0.2 MG/DL (ref 0–0.5)
BILIRUBIN DIRECT+TOT PNL SERPL-MCNC: 0.4 MG/DL (ref 0–0.8)
BUN SERPL-MCNC: 11 MG/DL (ref 9.8–20.1)
CALCIUM SERPL-MCNC: 9.5 MG/DL (ref 8.4–10.2)
CANCER AG125 SERPL-ACNC: 22.3 UNIT/ML (ref 0–35)
CEA SERPL-MCNC: <1.73 NG/ML (ref 0–3)
CHLORIDE SERPL-SCNC: 109 MMOL/L (ref 98–107)
CO2 SERPL-SCNC: 27 MMOL/L (ref 23–31)
CREAT SERPL-MCNC: 0.77 MG/DL (ref 0.55–1.02)
EOSINOPHIL # BLD AUTO: 0.1 X10(3)/MCL (ref 0–0.9)
EOSINOPHIL NFR BLD AUTO: 2 %
ERYTHROCYTE [DISTWIDTH] IN BLOOD BY AUTOMATED COUNT: 12.6 % (ref 11.5–14.5)
GLOBULIN SER-MCNC: 4.4 GM/DL (ref 2.4–3.5)
GLUCOSE SERPL-MCNC: 80 MG/DL (ref 82–115)
HCT VFR BLD AUTO: 44.2 % (ref 35–46)
HGB BLD-MCNC: 14.2 GM/DL (ref 12–16)
IMM GRANULOCYTES # BLD AUTO: 0.02 10*3/UL
IMM GRANULOCYTES NFR BLD AUTO: 0 %
LYMPHOCYTES # BLD AUTO: 2.6 X10(3)/MCL (ref 0.6–4.6)
LYMPHOCYTES NFR BLD AUTO: 40 %
MCH RBC QN AUTO: 29.3 PG (ref 26–34)
MCHC RBC AUTO-ENTMCNC: 32.1 GM/DL (ref 31–37)
MCV RBC AUTO: 91.3 FL (ref 80–100)
MONOCYTES # BLD AUTO: 0.4 X10(3)/MCL (ref 0.1–1.3)
MONOCYTES NFR BLD AUTO: 6 %
NEUTROPHILS # BLD AUTO: 3.38 X10(3)/MCL (ref 2.1–9.2)
NEUTROPHILS NFR BLD AUTO: 52 %
PLATELET # BLD AUTO: 196 X10(3)/MCL (ref 130–400)
PMV BLD AUTO: 9.5 FL (ref 7.4–10.4)
POTASSIUM SERPL-SCNC: 4.5 MMOL/L (ref 3.5–5.1)
PROT SERPL-MCNC: 8.4 GM/DL (ref 5.8–7.6)
RBC # BLD AUTO: 4.84 X10(6)/MCL (ref 4–5.2)
SODIUM SERPL-SCNC: 140 MMOL/L (ref 136–145)
WBC # SPEC AUTO: 6.5 X10(3)/MCL (ref 4.5–11)

## 2020-12-15 ENCOUNTER — HISTORICAL (OUTPATIENT)
Dept: CARDIOLOGY | Facility: CLINIC | Age: 65
End: 2020-12-15

## 2020-12-15 LAB
CHOLEST SERPL-MCNC: 216 MG/DL
CHOLEST/HDLC SERPL: 7 {RATIO} (ref 0–5)
HDLC SERPL-MCNC: 32 MG/DL (ref 35–60)
LDLC SERPL CALC-MCNC: 152 MG/DL (ref 50–140)
TRIGL SERPL-MCNC: 159 MG/DL (ref 37–140)
VLDLC SERPL CALC-MCNC: 32 MG/DL

## 2020-12-28 ENCOUNTER — HISTORICAL (OUTPATIENT)
Dept: RADIOLOGY | Facility: HOSPITAL | Age: 65
End: 2020-12-28

## 2022-04-10 ENCOUNTER — HISTORICAL (OUTPATIENT)
Dept: ADMINISTRATIVE | Facility: HOSPITAL | Age: 67
End: 2022-04-10
Payer: MEDICAID

## 2022-04-24 VITALS
WEIGHT: 239 LBS | SYSTOLIC BLOOD PRESSURE: 122 MMHG | BODY MASS INDEX: 37.51 KG/M2 | DIASTOLIC BLOOD PRESSURE: 80 MMHG | OXYGEN SATURATION: 93 % | HEIGHT: 67 IN

## 2022-04-30 NOTE — OP NOTE
DATE OF SURGERY:    07/18/2017    SURGEON:  Philip Baeza MD    attending physician:  Dr. Philip Baeza MD    RESIDENT SURGEON:  Destinee Rodney MD.    PROCEDURE PERFORMED:  Colonoscopy.    PREOPERATIVE DIAGNOSIS:  History of colon cancer.    POSTOPERATIVE DIAGNOSIS:  History of colon cancer.    BLOOD LOSS:  0 cc.    COMPLICATIONS:  None.    FINDINGS:  Status post left colon resection with end-to-side anastomosis at about 20 cm, anastomosis patent, otherwise normal colonic mucosa.    INDICATION FOR PROCEDURE:  A brief history was obtained in the preop holding area.  Risks and benefits of colonoscopy were discussed with the patient.  Informed consent was obtained.  The patient was taken to the GI suite and placed in the left lateral decubitus position.  MAC anesthesia was induced by the Anesthesia team.  A digital rectal exam was performed which was within normal limits.  A well lubricated colonoscope was then inserted into the rectum and advanced under direct visualization to the cecum.  Of note, upon entering the rectum, we identified the anastomosis at approximately 20 cm.  We saw the blind pouch and the true lumen.  We examined the pouch and saw normal postoperative surgical changes.  Moving through the true lumen we continued our advancement to the level of the cecum.  The scope was then gently retracted examining the entire extent of the colonic mucosa for any abnormalities in color, texture, or the presence of lesions or other abnormalities.  The entire extent of the colonic mucosa normal with no pathology noted.  In the distal rectum the scope was gently retroflexed to allow examination of the anal verge.  Again no pathology was noted in this region.  The scope was then gently retracted through the     patient's anus and the procedure terminated.  The patient was transferred to the recovery room afterwards in stable condition.        ______________________________  Destinee Rodney  MD    ______________________________  Philip MD KIRK Baeza  DD:  07/18/2017  Time:  12:17PM  DT:  07/18/2017  Time:  03:52PM  Job #:  [id number]

## 2022-05-04 NOTE — HISTORICAL [facility] CERNER
This is a historical note converted from Jeremiah. Formatting and pictures may have been removed.  Please reference Jeremiah for original formatting and attached multimedia. Chief Complaint  New referral for vaginal discharge  History of Present Illness  64 y.o.  new patient referral for Dr. Diego for recurrent vaginal discharge. She reports yellow/white discharge for the past week, foul smelling, itchy, and stinging. Reports similar episodes of yeast infections and other infections in the past, saw Dr. Diego in 2020 and was prescribed Boric acid with improvement in vaginal discharge for 3 months. She had not been previously treated prior to?Boric acid treatment.?Denies douches, does not take baths, has not been sexually active for years. Has remote history?of STI. Denies vaginal bleeding, menopausal for the past 15 years.  ?  Patient?notable for?PALB2 mutation with?history of?breast cancer in ?s/p left mastectomy and?chemotherapy;?and colon cancer?in ?s/p colon resection and chemotherapy currently in remission for both.  Gynecological History  Last Menstrual Period: 14  Menstrual Status Intake: Postmenopausal  Review of Systems  The patient denies the following:?(positive ROS is highlighted)  Constitutional:?weight loss, weight gain, fever/chills, night sweats, excessive fatigue  Endocrine: heat or cold intolerance, excessive thirst, abnormal hair growth?  Eyes, Ears, Nose & Throat: visual problems, hearing problems, nose bleeds, sinus problems, sore throat  Gastrointestinal: frequent heartburn, abdominal pain, blood in stools, diarrhea, constipation  Hematologic: easy bruising, bleeding gums, prolonged bleeding, clotting problems  Cardiovascular: chest pain, palpitations, trouble breathing when lying flat  Respiratory:?shortness of breath, chronic cough, wheezing  Skin: itching,?rash, new moles or lesions  Psychosocial:?difficulty sleeping, anxiety or panic attacks,? depression  Gynecologic:  see HPI  Urinary:?stress incontinence, urge incontinence, hematuria, frequency, urgency, dysuria, difficulty urinating,?bulge in vagina  Neurologic: headaches, dizziness, memory loss.  Musculoskeletal:?joint?stiffness,?joint?pain/swelling,?back pain.  Physical Exam  Vitals & Measurements  T:?37.0? ?C (Oral)? HR:?69(Peripheral)? RR:?16? BP:?116/73?  HT:?170?cm? HT:?170.00?cm? WT:?106.8?kg? WT:?106.800?kg? BMI:?36.96?  General: NAD, A/Ox3  Respiratory: CTAB  Cardiovascular: RRR no murmurs  Abdomen: soft, obese, nondistended, nontender to palpation  Incisions: well-healed Pfannenstiel and midline vertical incision  External genitalia: Normal female anatomy, no masses/lesions. Normal appearing urethral meatus. Normal appearing external anus.  Sterile speculum exam: vaginal mucosa normal in appearance.?Thin white non-odorous discharge in the vault. Cervix well visualized, smooth in contour no masses or lesions.  Bimanual exam: Vaginal with good capacity. Uterus 8cm in size, no cervical motion tenderness. Normal contour.?Moderate descent and?mobile. No adnexal fullness/tenderness.  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  Follow-up telemedicine visit in 4 weeks  Ordered:  clindamycin topical, 1 mary lou, VAG, Once a day (at bedtime), X 7 day(s), # 5 gm, 0 Refill(s), [facility], 170, cm, Height/Length Dosing, 08/10/20 11:06:00 CDT, 106.8, kg, Weight Dosing, 08/10/20 11:06:00 CDT  Fungal Culture, Routine collect, 08/10/20 11:36:00 CDT, Swab, Vagina, Nurse collect, Stop date 08/10/20 11:59:00 CDT, Vaginal discharge  Wet Prep Smear, Routine collect, Vaginal, 08/10/20 11:36:00 CDT, Stop date 08/10/20 11:59:00 CDT, Nurse collect,  Vaginal discharge, 08/10/20 11:36:00 CDT  ?   Problem List/Past Medical History  Ongoing  Allergy to pollen  Anxiety  Arthritis  Constipation  Gastroesophageal reflux disease  Hiatal hernia  History of breast cancer  History of colon cancer(  Confirmed  )  HLD - Hyperlipidaemia  Obesity  Paraumbilical hernia  Rhinitis, allergic(  Confirmed  )  Historical  Breast cancer  Hypertriglyceridemia  IGT - Impaired glucose tolerance  Malignant tumor of colon  Sciatica  Procedure/Surgical History  Catheter placement in coronary artery(s) for coronary angiography, including intraprocedural injection(s) for coronary angiography, imaging supervision and interpretation; with left heart catheterization including intraprocedural injection(s) for left justen (05/27/2020)  Fluoroscopy of Multiple Coronary Arteries using Low Osmolar Contrast (05/27/2020)  Measurement of Cardiac Sampling and Pressure, Left Heart, Percutaneous Approach (05/27/2020)  Mammogram (2019)  Colonoscopy (07/18/2017)  Colonoscopy, flexible; diagnostic, including collection of specimen(s) by brushing or washing, when performed (separate procedure) (07/18/2017)  Inspection of Lower Intestinal Tract, Via Natural or Artificial Opening Endoscopic (07/18/2017)  EXTERNAL DRUG STUDY SENDOUT (08/17/2015)  EXTERNAL DRUG STUDY SENDOUT (08/17/2015)  Mastectomy Simple (Left) (06/08/2015)  Mastectomy, simple, complete (06/08/2015)  Unilateral simple mastectomy (06/08/2015)  Biopsy Breast With Localization (Left) (05/06/2015)  Biopsy or excision of lymph node(s); open, deep axillary node(s) (05/06/2015)  Biopsy Sentinal Node (None) (05/06/2015)  Excision of axillary lymph node (05/06/2015)  Excision of breast lesion identified by preoperative placement of radiological marker, open; single lesion (05/06/2015)  Local excision of lesion of breast (05/06/2015)  Urinalysis, by dip stick or tablet reagent for bilirubin, glucose, hemoglobin, ketones, leukocytes, nitrite, pH,  protein, specific gravity, urobilinogen, any number of these constituents; non-automated, without microscopy (2015)  Biopsy Gastrointestional (2015)  Colonoscopy (2015)  Colonoscopy, flexible; with biopsy, single or multiple (2015)  Endoscopic polypectomy of large intestine (2015)  Bronchoscopy, Diagnostic (2014)  Closed [endoscopic] biopsy of larynx (2014)  Esophagoscopy (None) (2014)  Laryngoscopy (None) (2014)  Laryngoscopy, direct, operative, with biopsy;. (2014)  URINALYSIS (2014)  URINALYSIS (2014)  removal mediport ()  colon resection ()  mediport ()  Bilateral tubal ligation ()   delivery ()  Rotator cuff   Medications  albuterol 0.083% inhalation solution, 2.5 mg= 3 mL, NEB, TID  albuterol CFC free 90 mcg/inh inhalation aerosol with adapter, 2 puff(s), INH, q4hr, PRN, 3 refills  alPRAzolam 0.5 mg oral tablet, 0.5 mg= 1 tab(s), Oral, TID  bisacodyl 5 mg ORAL enteric coated tablet, 30 mg= 6 tab(s), Oral, Once  DuoNeb 0.5 mg-2.5 mg/3 mL inhalation solution, 3 mL, NEB, QID, PRN  magnesium citrate oral liquid, 1 bottle(s), Oral, Once  magnesium citrate oral liquid, 8.725 gm= 150 mL, Oral, Once, PRN  Miralax - for colonoscopy prep, 238 gm= 14 packet(s), Oral, Once  MiraLax oral powder for reconstitution, 17 gm, Oral, Daily, 1 refills  triamcinolone 0.025% topical ointment, See Instructions  Allergies  Lortab?(Visual hallucinations)  Morphine Sulfate?(Itching)  Norco  Tegaderm dressing?(Blisters)  traMADol 50 mg oral tablet, disintegrating  Social History  Abuse/Neglect  No, 2020  Alcohol - Low Risk, 2015  Current, 1-2 times per month, 2014  Employment/School  Leave of absence 4 months., 2020  Exercise - Does not exercise, 2015  Self assessment: Fair condition., 2015  Home/Environment  Lives with Children. Living situation: Home/Independent. Alcohol abuse in household: No.  Substance abuse in household: No. Smoker in household: No. Injuries/Abuse/Neglect in household: No. Family/Friends available for support: Yes. Concern for family members at home: No. Major illness in household: No. Financial concerns: Yes. TV/Computer concerns: No., 02/28/2015  Nutrition/Health  Regular, 02/28/2015  Sexual  Gender Identity Identifies as female., 06/02/2020  Spiritual/Cultural  Confucianism, 03/24/2020  Substance Use - Denies Substance Abuse, 02/24/2015  Never, 08/05/2016  Tobacco - Denies Tobacco Use, 02/24/2015  Former smoker, quit more than 30 days ago, N/A, 30 Years (Age stopped)., 07/20/2020  Marital Status    Family History  Diabetes mellitus type 2: Mother, Father, Sister and Brother.  Hypertension.: Mother and Father.  Metastatic cancer: Father.  Primary malignant neoplasm of breast: Sister.  Health Maintenance  Health Maintenance  ???Pending?(in the next year)  ??? ??OverDue  ??? ? ? ?Cognitive Screening due??01/02/20??and every 1??year(s)  ??? ??Due?  ??? ? ? ?Aspirin Therapy for CVD Prevention due??08/10/20??and every 1??year(s)  ??? ? ? ?IPPE due??08/10/20??One-time only  ??? ? ? ?Influenza Vaccine due??08/10/20??and every?  ??? ? ? ?Medicare Annual Wellness Exam due??08/10/20??and every 1??year(s)  ??? ? ? ?Pneumococcal Vaccine due??08/10/20??and every?  ??? ? ? ?Zoster Vaccine due??08/10/20??and every?  ??? ??Refused?  ??? ? ? ?Tetanus Vaccine due??08/10/20??and every 10??year(s)  ??? ??Due In Future?  ??? ? ? ?Obesity Screening not due until??01/01/21??and every 1??year(s)  ??? ? ? ?Advance Directive not due until??01/02/21??and every 1??year(s)  ??? ? ? ?Alcohol Misuse Screening not due until??01/02/21??and every 1??year(s)  ??? ? ? ?Fall Risk Assessment not due until??01/02/21??and every 1??year(s)  ??? ? ? ?Functional Assessment not due until??01/02/21??and every 1??year(s)  ??? ? ? ?ADL Screening not due until??06/24/21??and every 1??year(s)  ??? ? ? ?Blood Pressure Screening  not due until??07/09/21??and every 1??year(s)  ??? ? ? ?Hypertension Management-Blood Pressure not due until??07/09/21??and every 1??year(s)  ??? ? ? ?Body Mass Index Check not due until??07/20/21??and every 1??year(s)  ??? ? ? ?Depression Screening not due until??07/20/21??and every 1??year(s)  ??? ? ? ?Diabetes Screening not due until??07/20/21??and every 1??year(s)  ??? ? ? ?Hypertension Management-BMP not due until??07/20/21??and every 1??year(s)  ???Satisfied?(in the past 1 year)  ??? ??Satisfied?  ??? ? ? ?ADL Screening on??06/24/20.??Satisfied by Sil Beard LPN  ??? ? ? ?Advance Directive on??05/12/20.??Satisfied by Sil Beard LPN  ??? ? ? ?Alcohol Misuse Screening on??03/24/20.??Satisfied by Taryn Webb LPN  ??? ? ? ?Blood Pressure Screening on??07/09/20.??Satisfied by Macy Monge RN.  ??? ? ? ?Body Mass Index Check on??07/20/20.??Satisfied by Rhonda Goldberg LPN  ??? ? ? ?Bone Density Screening on??10/29/19.??Satisfied by Aspen Gandhi  ??? ? ? ?Breast Cancer Screening on??10/29/19.??Satisfied by Gretel Paez  ??? ? ? ?Cervical Cancer Screening on??01/21/20.??Satisfied by Adelina Vilchis  ??? ? ? ?Depression Screening on??07/20/20.??Satisfied by Rhonda Goldberg LPN  ??? ? ? ?Diabetes Maintenance-Eye Exam on??06/02/20.??Satisfied by Indu Javed  ??? ? ? ?Diabetes Screening on??07/20/20.??Satisfied by Zari Baird  ??? ? ? ?Fall Risk Assessment on??07/20/20.??Satisfied by Rhonda Goldberg LPN  ??? ? ? ?Functional Assessment on??05/27/20.??Satisfied by Damaris Ruiz RN  ??? ? ? ?Hypertension Management-BMP on??07/20/20.??Satisfied by Zari Baird  ??? ? ? ?Influenza Vaccine on??03/10/20.??Satisfied by Rossy Joy LPN  ??? ? ? ?Lipid Screening on??05/19/20.??Satisfied by Fam Lema Jr.  ??? ? ? ?Obesity Screening on??07/20/20.??Satisfied by Rhonda Goldberg LPN  ??? ? ? ?Pneumococcal Vaccine on??10/16/19.??Satisfied by Sil Beard LPN  F  ??? ??Refused?  ??? ? ? ?Zoster Vaccine on??01/21/20.??Recorded by Nessa Ye  ?      I saw and evaluated the patient. Discussed with resident and agree with resident?s findings and plan as documented in the resident?s note  ?  ?

## 2023-01-13 DIAGNOSIS — Z01.419 ENCOUNTER FOR ANNUAL ROUTINE GYNECOLOGICAL EXAMINATION: Primary | ICD-10-CM

## 2023-01-18 DIAGNOSIS — K21.9 GASTROESOPHAGEAL REFLUX DISEASE, UNSPECIFIED WHETHER ESOPHAGITIS PRESENT: Primary | ICD-10-CM

## 2023-02-24 ENCOUNTER — OFFICE VISIT (OUTPATIENT)
Dept: GYNECOLOGY | Facility: CLINIC | Age: 68
End: 2023-02-24
Payer: COMMERCIAL

## 2023-02-24 VITALS
HEART RATE: 68 BPM | RESPIRATION RATE: 20 BRPM | HEIGHT: 66 IN | BODY MASS INDEX: 37.67 KG/M2 | SYSTOLIC BLOOD PRESSURE: 133 MMHG | DIASTOLIC BLOOD PRESSURE: 86 MMHG | OXYGEN SATURATION: 100 % | TEMPERATURE: 98 F | WEIGHT: 234.38 LBS

## 2023-02-24 DIAGNOSIS — Z91.89 HIGH RISK OF OVARIAN CANCER: ICD-10-CM

## 2023-02-24 DIAGNOSIS — L30.4 INTERTRIGO: ICD-10-CM

## 2023-02-24 DIAGNOSIS — Z12.11 ENCOUNTER FOR COLONOSCOPY DUE TO HISTORY OF COLON CANCER: ICD-10-CM

## 2023-02-24 DIAGNOSIS — Z85.3 HISTORY OF BREAST CANCER: ICD-10-CM

## 2023-02-24 DIAGNOSIS — C50.919 PALB2-RELATED BREAST CANCER IN FEMALE: ICD-10-CM

## 2023-02-24 DIAGNOSIS — Z01.419 ENCOUNTER FOR ANNUAL ROUTINE GYNECOLOGICAL EXAMINATION: Primary | ICD-10-CM

## 2023-02-24 DIAGNOSIS — Z85.038 ENCOUNTER FOR COLONOSCOPY DUE TO HISTORY OF COLON CANCER: ICD-10-CM

## 2023-02-24 DIAGNOSIS — Z12.31 VISIT FOR SCREENING MAMMOGRAM: ICD-10-CM

## 2023-02-24 PROCEDURE — 3288F FALL RISK ASSESSMENT DOCD: CPT | Mod: CPTII,,, | Performed by: NURSE PRACTITIONER

## 2023-02-24 PROCEDURE — 99215 OFFICE O/P EST HI 40 MIN: CPT | Mod: PBBFAC | Performed by: NURSE PRACTITIONER

## 2023-02-24 PROCEDURE — G0101 PR CA SCREEN;PELVIC/BREAST EXAM: ICD-10-PCS | Mod: S$PBB,,, | Performed by: NURSE PRACTITIONER

## 2023-02-24 PROCEDURE — 3079F PR MOST RECENT DIASTOLIC BLOOD PRESSURE 80-89 MM HG: ICD-10-PCS | Mod: CPTII,,, | Performed by: NURSE PRACTITIONER

## 2023-02-24 PROCEDURE — 3008F BODY MASS INDEX DOCD: CPT | Mod: CPTII,,, | Performed by: NURSE PRACTITIONER

## 2023-02-24 PROCEDURE — 1159F PR MEDICATION LIST DOCUMENTED IN MEDICAL RECORD: ICD-10-PCS | Mod: CPTII,,, | Performed by: NURSE PRACTITIONER

## 2023-02-24 PROCEDURE — 3288F PR FALLS RISK ASSESSMENT DOCUMENTED: ICD-10-PCS | Mod: CPTII,,, | Performed by: NURSE PRACTITIONER

## 2023-02-24 PROCEDURE — G0101 CA SCREEN;PELVIC/BREAST EXAM: HCPCS | Mod: S$PBB,,, | Performed by: NURSE PRACTITIONER

## 2023-02-24 PROCEDURE — 1101F PR PT FALLS ASSESS DOC 0-1 FALLS W/OUT INJ PAST YR: ICD-10-PCS | Mod: CPTII,,, | Performed by: NURSE PRACTITIONER

## 2023-02-24 PROCEDURE — 3008F PR BODY MASS INDEX (BMI) DOCUMENTED: ICD-10-PCS | Mod: CPTII,,, | Performed by: NURSE PRACTITIONER

## 2023-02-24 PROCEDURE — 3075F PR MOST RECENT SYSTOLIC BLOOD PRESS GE 130-139MM HG: ICD-10-PCS | Mod: CPTII,,, | Performed by: NURSE PRACTITIONER

## 2023-02-24 PROCEDURE — G0101 CA SCREEN;PELVIC/BREAST EXAM: HCPCS | Mod: PBBFAC | Performed by: NURSE PRACTITIONER

## 2023-02-24 PROCEDURE — 1101F PT FALLS ASSESS-DOCD LE1/YR: CPT | Mod: CPTII,,, | Performed by: NURSE PRACTITIONER

## 2023-02-24 PROCEDURE — 3079F DIAST BP 80-89 MM HG: CPT | Mod: CPTII,,, | Performed by: NURSE PRACTITIONER

## 2023-02-24 PROCEDURE — 3075F SYST BP GE 130 - 139MM HG: CPT | Mod: CPTII,,, | Performed by: NURSE PRACTITIONER

## 2023-02-24 PROCEDURE — 1159F MED LIST DOCD IN RCRD: CPT | Mod: CPTII,,, | Performed by: NURSE PRACTITIONER

## 2023-02-24 RX ORDER — PANTOPRAZOLE SODIUM 40 MG/1
40 TABLET, DELAYED RELEASE ORAL
COMMUNITY
Start: 2023-02-03 | End: 2023-12-13

## 2023-02-24 RX ORDER — FLUTICASONE FUROATE, UMECLIDINIUM BROMIDE AND VILANTEROL TRIFENATATE 200; 62.5; 25 UG/1; UG/1; UG/1
1 POWDER RESPIRATORY (INHALATION) DAILY
COMMUNITY
End: 2023-05-22

## 2023-02-24 RX ORDER — IPRATROPIUM BROMIDE AND ALBUTEROL SULFATE 2.5; .5 MG/3ML; MG/3ML
SOLUTION RESPIRATORY (INHALATION)
COMMUNITY
Start: 2022-12-14

## 2023-02-24 RX ORDER — MECLIZINE HYDROCHLORIDE 50 MG/1
25 TABLET ORAL 3 TIMES DAILY PRN
COMMUNITY
End: 2023-12-13

## 2023-02-24 RX ORDER — KETOROLAC TROMETHAMINE 10 MG/1
10 TABLET, FILM COATED ORAL EVERY 6 HOURS
COMMUNITY
End: 2023-05-22

## 2023-02-24 RX ORDER — METHYLPREDNISOLONE 4 MG/1
TABLET ORAL
COMMUNITY
Start: 2022-10-17 | End: 2023-05-22

## 2023-02-24 RX ORDER — FLUTICASONE PROPIONATE AND SALMETEROL 500; 50 UG/1; UG/1
POWDER RESPIRATORY (INHALATION)
COMMUNITY
Start: 2022-08-31 | End: 2023-05-22

## 2023-02-24 RX ORDER — HYDROCHLOROTHIAZIDE 12.5 MG/1
12.5 TABLET ORAL DAILY PRN
COMMUNITY
Start: 2023-01-25 | End: 2023-05-22

## 2023-02-24 RX ORDER — MONTELUKAST SODIUM 10 MG/1
10 TABLET ORAL
COMMUNITY
Start: 2023-02-15

## 2023-02-24 RX ORDER — NITROFURANTOIN 25; 75 MG/1; MG/1
100 CAPSULE ORAL 2 TIMES DAILY
COMMUNITY
Start: 2022-09-20 | End: 2023-05-22

## 2023-02-24 RX ORDER — TRIAMCINOLONE ACETONIDE 1 MG/G
PASTE DENTAL
COMMUNITY
Start: 2022-10-14 | End: 2023-05-22

## 2023-02-24 RX ORDER — PREDNISONE 20 MG/1
TABLET ORAL
COMMUNITY
Start: 2022-11-14 | End: 2023-12-13

## 2023-02-24 RX ORDER — CETIRIZINE HYDROCHLORIDE 10 MG/1
10 TABLET ORAL
COMMUNITY
Start: 2023-02-15

## 2023-02-24 RX ORDER — PROMETHAZINE HYDROCHLORIDE AND DEXTROMETHORPHAN HYDROBROMIDE 6.25; 15 MG/5ML; MG/5ML
SYRUP ORAL
COMMUNITY
Start: 2023-02-14 | End: 2023-12-13

## 2023-02-24 RX ORDER — DOXYCYCLINE 100 MG/1
100 CAPSULE ORAL 2 TIMES DAILY
COMMUNITY
Start: 2022-09-22 | End: 2023-05-22

## 2023-02-24 RX ORDER — PRAVASTATIN SODIUM 40 MG/1
40 TABLET ORAL
COMMUNITY
Start: 2023-01-25

## 2023-02-24 RX ORDER — MOLNUPIRAVIR 200 MG/1
CAPSULE ORAL
COMMUNITY
Start: 2022-08-30 | End: 2023-05-22

## 2023-02-24 RX ORDER — ALPRAZOLAM 0.5 MG/1
TABLET ORAL
COMMUNITY
Start: 2022-12-14

## 2023-02-24 RX ORDER — FLUCONAZOLE 100 MG/1
100 TABLET ORAL
COMMUNITY
Start: 2022-12-21 | End: 2023-05-22

## 2023-02-24 RX ORDER — CLOTRIMAZOLE 1 %
CREAM (GRAM) TOPICAL 2 TIMES DAILY
Qty: 45 G | Refills: 3 | Status: SHIPPED | OUTPATIENT
Start: 2023-02-24 | End: 2023-06-30

## 2023-02-24 RX ORDER — NEBULIZER AND COMPRESSOR
EACH MISCELLANEOUS
COMMUNITY
Start: 2022-10-17

## 2023-02-24 NOTE — PROGRESS NOTES
Subjective:       Patient ID: Annyganesh Ferreira is a 67 y.o. female.    Chief Complaint:  Gynecologic Exam      History of Present Illness  The patient  here for annual exam. Pt is postmenopausal. Denies any vaginal bleeding. Denies history of abnormal paps. Last pap -NIL and HPV neg. MG-2020 & BIRADS 2. Pt with PALB2 mutation with history of breast cancer in  s/p left mastectomy and chemotherapy; and colon cancer in  s/p colon resection and chemotherapy currently in remission for both. Last seen by oncology  and GI in , lost to f/u and d/t covid. Pt was seen by GYN in  for vaginal discharge, no recommendations for f/u d/t PALB2 mutation. Per previous notes in [facility], pt has declines prophylactic BSO and Rt mastectomy in the past. Per last oncology note, pelvic uls, exam and  every 6 months. Last competed in  and both were normal. Denies breast or urinary complaints. Denies pelvic pain, abnormal bleeding or discharge. 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 Then continue maintenance therapy for twice weekly for 6 months, no complaints today. Pt c/o chase groin itching and Rt breast fold itching. Pt reports no STIs in the past and no concerns. Denies tobacco use. Dep. screening 0. Denies fly hx of ovarian, uterine or colon cancer. Sister with breast cancer x2.     GYN & OB History  No LMP recorded. Patient is postmenopausal.       Review of patient's allergies indicates:   Allergen Reactions    Adhesive      tegaderm    Morphine     Lortab [hydrocodone-acetaminophen] Other (See Comments)     hallucinations             Past Medical History:   Diagnosis Date    Asthma     Breast cancer     Colon cancer 2008    colon    GERD (gastroesophageal reflux disease)     Hayfever     Obese      OB History    Para Term  AB Living   3 3           SAB IAB Ectopic  "Multiple Live Births                  # Outcome Date GA Lbr Vic/2nd Weight Sex Delivery Anes PTL Lv   3 Para            2 Para            1 Para                 Review of Systems  Review of Systems    Negative except for pertinent findings for positives per HPI     Objective:    Physical Exam    /86 (BP Location: Right arm, Patient Position: Sitting, BP Method: Medium (Automatic))   Pulse 68   Temp 98.3 °F (36.8 °C) (Oral)   Resp 20   Ht 5' 6" (1.676 m)   Wt 106.3 kg (234 lb 6.4 oz)   SpO2 100%   BMI 37.83 kg/m²   GENERAL: Well-developed female in no acute distress.  SKIN: Normal to inspection, warm and intact.  BREASTS: No masses, lumps, discharge, tenderness with mild erythema to Rt breast fold. Lt mastectomy.  VULVA: General appearance normal; external genitalia with no lesions, mild erythema kayden groin  VAGINA: Mucosa atrophic, no abnormal discharge or lesions.  CERVIX: Atrophic, no abnormal discharge.  BIMANUAL EXAM: reveals a 12 week-sized uterus. The uterus is non tender. Kayden adnexa reveal no tenderness.  PSYCHIATRIC: Patient is oriented to person, place, and time. Mood and affect are normal.    Assessment:       1. Encounter for annual routine gynecological examination    2. Intertrigo    3. High risk of ovarian cancer    4. Encounter for colonoscopy due to history of colon cancer    5. History of breast cancer    6. Visit for screening mammogram    7. 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    Diagnoses and all orders for this visit:    Encounter for annual routine gynecological examination    Intertrigo  -     clotrimazole (LOTRIMIN) 1 % cream; Apply topically 2 (two) times daily. 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 Last pap 2020-NIL and HPV neg.  Referral to GYN for evaluation and management of HR ovarian cancer(PALB2 mutation)- and pelvic uls ordered per last oncology note in 2020-recommend every 6 months  Referral to Oncology-reestablish care-hx of breast/colon cancer and PALB2 mutation  Referral to Endo-colonoscopy d/t hx of colon cancer, last colonoscopy 2017, repeat in 5 years  MG ordered  Intertrigo-clotrimazole  Follow up in about 1 year (around 2/24/2024) for annual exam.

## 2023-03-14 ENCOUNTER — HOSPITAL ENCOUNTER (OUTPATIENT)
Dept: RADIOLOGY | Facility: HOSPITAL | Age: 68
Discharge: HOME | End: 2023-03-14
Attending: NURSE PRACTITIONER
Payer: COMMERCIAL

## 2023-03-14 DIAGNOSIS — Z12.31 VISIT FOR SCREENING MAMMOGRAM: ICD-10-CM

## 2023-03-14 DIAGNOSIS — Z91.89 HIGH RISK OF OVARIAN CANCER: ICD-10-CM

## 2023-03-14 PROCEDURE — 77067 SCR MAMMO BI INCL CAD: CPT | Mod: TC

## 2023-03-14 PROCEDURE — 77067 SCR MAMMO BI INCL CAD: CPT | Mod: 26,,, | Performed by: RADIOLOGY

## 2023-03-14 PROCEDURE — 77067 MAMMO DIGITAL SCREENING BILAT WITH TOMO: ICD-10-PCS | Mod: 26,,, | Performed by: RADIOLOGY

## 2023-03-14 PROCEDURE — 77063 BREAST TOMOSYNTHESIS BI: CPT | Mod: 26,,, | Performed by: RADIOLOGY

## 2023-03-14 PROCEDURE — 76856 US EXAM PELVIC COMPLETE: CPT | Mod: TC

## 2023-03-14 PROCEDURE — 77063 MAMMO DIGITAL SCREENING BILAT WITH TOMO: ICD-10-PCS | Mod: 26,,, | Performed by: RADIOLOGY

## 2023-03-27 ENCOUNTER — TELEPHONE (OUTPATIENT)
Dept: GYNECOLOGY | Facility: CLINIC | Age: 68
End: 2023-03-27

## 2023-03-27 NOTE — TELEPHONE ENCOUNTER
Per NP 2 to move patients appt up. Patient's appt with the residents is now on 4/3/23 at 8:45 AM. Please call patient and make her aware. If for some reason patient cannot attend that date or time please let me know. Thank you.

## 2023-04-03 ENCOUNTER — OFFICE VISIT (OUTPATIENT)
Dept: GYNECOLOGY | Facility: CLINIC | Age: 68
End: 2023-04-03
Payer: COMMERCIAL

## 2023-04-03 VITALS
DIASTOLIC BLOOD PRESSURE: 62 MMHG | SYSTOLIC BLOOD PRESSURE: 95 MMHG | RESPIRATION RATE: 20 BRPM | TEMPERATURE: 99 F | OXYGEN SATURATION: 100 % | HEART RATE: 71 BPM

## 2023-04-03 DIAGNOSIS — C50.919 MALIGNANT NEOPLASM OF BREAST ASSOCIATED WITH MUTATION IN PALB2 GENE IN FEMALE: Primary | ICD-10-CM

## 2023-04-03 DIAGNOSIS — N94.9 ADNEXAL CYST: ICD-10-CM

## 2023-04-03 PROCEDURE — 99214 OFFICE O/P EST MOD 30 MIN: CPT | Mod: PBBFAC

## 2023-04-03 RX ORDER — LISINOPRIL 5 MG/1
5 TABLET ORAL DAILY
COMMUNITY
Start: 2023-03-30 | End: 2023-12-13

## 2023-04-03 NOTE — PROGRESS NOTES
[facility] GYNECOLOGY CLINIC NOTE     Anny Ferreira is a 67 y.o.  presenting to GYN clinic for discussion of gynecological recommendations in the setting of PALB2 mutation. She has a history of breast cancer in  s/p left mastectomy with PALB2 mutation noted and chemotherapy as well as colon cancer in  s/p colon resection and chemotherapy, currently in remission for both cancers. She was last seen by Oncology in  and GI in . She recently was seen by Gyn NP on  and referred for mammogram, colonoscopy, and further Onc care. She has no complaints today. Denies post-menopausal bleeding.    Gynecology  Menopause at age 49  Denies h/o STIs and abnormal pap smears    OB History          3    Para   3    Term   3       0    AB   0    Living   3         SAB        IAB        Ectopic        Multiple        Live Births                    Past Medical History:   Diagnosis Date    Asthma     Breast cancer     Colon cancer 2008    colon    Essential (primary) hypertension     GERD (gastroesophageal reflux disease)     Hayfever     Obese       Past Surgical History:   Procedure Laterality Date     SECTION      30years ago     COLON SURGERY      COLONOSCOPY      MOUTH SURGERY      NOSE SURGERY      rotater cuff Right 2006    TUBAL LIGATION        Current Outpatient Medications   Medication Instructions    albuterol-ipratropium (DUO-NEB) 2.5 mg-0.5 mg/3 mL nebulizer solution No dose, route, or frequency recorded.    ALPRAZolam (XANAX) 0.5 MG tablet No dose, route, or frequency recorded.    cetirizine (ZYRTEC) 10 mg, Oral    clotrimazole (LOTRIMIN) 1 % cream Topical (Top), 2 times daily, For 30 days    COMP-AIR NEBULIZER COMPRESSOR Neli use as directed    doxycycline (VIBRAMYCIN) 100 mg, Oral, 2 times daily    esomeprazole (NEXIUM) 40 mg, Before breakfast    fluconazole (DIFLUCAN) 100 mg, Oral    fluticasone 50 mcg/actuation DsDv 1 puff, Nasal, Daily     fluticasone-salmeterol diskus inhaler 500-50 mcg No dose, route, or frequency recorded.    fluticasone-umeclidin-vilanter (TRELEGY ELLIPTA) 200-62.5-25 mcg inhaler 1 puff, Inhalation, Daily    hydroCHLOROthiazide (HYDRODIURIL) 12.5 mg, Oral, Daily PRN    ketorolac (TORADOL) 10 mg, Oral, Every 6 hours    LAGEVRIO, EUA, 200 mg capsule (EUA) No dose, route, or frequency recorded.    levocetirizine (XYZAL) 5 mg, Nightly    lisinopriL (PRINIVIL,ZESTRIL) 5 mg, Oral, Daily    meclizine (ANTIVERT) 25 mg, Oral, 3 times daily PRN    methylPREDNISolone (MEDROL DOSEPACK) 4 mg tablet TAKE AS DIRECTED ON PACKAGE    montelukast (SINGULAIR) 10 mg, Oral    nitrofurantoin, macrocrystal-monohydrate, (MACROBID) 100 MG capsule 100 mg, Oral, 2 times daily    pantoprazole (PROTONIX) 40 mg, Oral    pravastatin (PRAVACHOL) 40 mg, Oral    predniSONE (DELTASONE) 20 MG tablet Oral    promethazine-dextromethorphan (PROMETHAZINE-DM) 6.25-15 mg/5 mL Syrp SMARTSI-10 Milliliter(s) By Mouth 4 Times Daily PRN    ranitidine (ZANTAC) 150 mg, Oral, Nightly    triamcinolone acetonide 0.1% (KENALOG) 0.1 % paste SMARTSIG:TO TEETH 3 Times Daily     Social History     Tobacco Use    Smoking status: Never    Smokeless tobacco: Never   Substance Use Topics    Alcohol use: Yes     Comment: rarely     Drug use: No       Review of Systems  Pertinent items are noted in HPI.     Objective:     BP 95/62   Pulse 71   Temp 98.5 °F (36.9 °C)   Resp 20   SpO2 100%     Physical Exam:  Gen: Well-nourished, well-developed female appearing stated age. Alert, cooperative, in no acute distress.  Abdomen: Soft, non-tender, no masses.  Extrem: Extremities normal, atraumatic, non-tender calves.  External genitalia: Normal female genetalia without lesion, discharge or tenderness.   Speculum Exam: Vaginal vault without discharge, nonodorous, no lesions/masses seen. Cervical os visualized as closed, no lesions/masses.   Bimanual Exam: No cervical motion tenderness. Uterus  8-10w in size, mobile, adequate capacity with minimal descent. No adnexal masses. Non-tender exam.   Rectovaginal Exam: No adnexal masses or fullness appreciated.   Note: RN chaperone present for entirety of exam.    Relevant Labs:   Component Ref Range & Units 2 wk ago  (3/14/23) 2 yr ago  (11/3/20) 2 yr ago  (8/24/20)   Cancer Antigen 125 0.0 - 35.0 unit/mL 16.8  22.3  22.2        Relevant Imaging:    Mammo 2/24/23   - MAMMO DIGITAL SCREENING BILAT WITH GIL  BILATERAL DIGITAL SCREENING MAMMOGRAM 3D/2D WITH CAD: 3/14/2023  HISTORY: 67-year-old woman presents for screening mammogram. She has a history of left mastectomy on 06/08/2015 for IDC, DCIS.    COMPARISONS: Comparison is made to exams dated:  5/21/2020 mammogram, 10/29/2019 mammogram, 10/23/2018 mamm[facility] - [facility], 9/19/2017 mammogram, 9/19/2016 mammogram, and 7/30/2015 mammogram - [facility].    TECHNIQUE: Digital mammography views were performed with tomosynthesis. Current study was evaluated with a Computer Aided Detection (CAD) system.   BREAST COMPOSITION: There are scattered areas of fibroglandular tissue.   FINDINGS:   No suspicious mass, asymmetry, distortion, or calcification is identified.   IMPRESSION: NEGATIVE  Right Breast: Negative (BI-RADS 1)  Recommendations:  Recommend continued annual screening mammography (with Digital Breast Tomosynthesis), according to American College of Radiology guidelines      US PELVIS COMP WITH TRANSVAG NON-OB (XPD) 2/24/23  CLINICAL HISTORY:  PALB2 mutation, hx of breast, colon cancer; Other specified personal risk factors, not elsewhere classified  TECHNIQUE:  Transabdominal sonography of the pelvis was performed, followed by transvaginal sonography to better evaluate the uterus and ovaries.  COMPARISON:  None.  FINDINGS:  UTERUS/CERVIX:  Size: 9.2 x 4.8 x 4.2 cm  Masses: Heterogeneous myometrium.  Endometrium: 5.5 mm.  Nabothian cysts of the cervix.  RIGHT  OVARY:  Attempted visualization of the right ovary.  Right ovary not seen for unknown reason, possibly due to shadowing bowel-gas and/or body habitus.  LEFT OVARY:  Indeterminate heterogeneous structure at the left adnexa measuring 5.9 x 4.3 x 4.3 cm.  Mixed internal echogenicity within this lesion including hyperechoic, shadowing areas.  FREE FLUID:  None  Impression:  Indeterminate heterogeneous lesion at the left adnexa.  It is unclear if this is ovarian or related to bowel.  Correlate with physical exam.  Suggest CT of the pelvis with contrast.    Assessment:       67 y.o.  here for discussion of discussion of gynecological recommendations in the setting of PALB2 mutation and f/u of TVUS and CA-125 results.     1. Malignant neoplasm of breast associated with mutation in PALB2 gene in female        2. Adnexal cyst          Plan:     -- discussed option of risk-reducing BSO with patient today  -- patient has been struggling with respiratory status due to h/o COVID and poorly-controlled asthma, declining surgical management at this time  -- TVUS significant for complex left ovarian mass, not palpated on exam  -- given increased risk of ovarian and peritoneal cancer with PALB2 mtuation, recommend further imaging with MRI pelvis; patient with significant h/o claustrophobia - will attempt to schedule open MRI  -- Onc appointment scheduled with Dr. Amaro on ; EGD/colonoscopy scheduled on     Problem List Items Addressed This Visit    None  Visit Diagnoses       Malignant neoplasm of breast associated with mutation in PALB2 gene in female    -  Primary    Adnexal cyst              Return to clinic for telemedicine visit to discuss results of MRI pelvis and in 1 year for annual WWE with Gyn NP    Discussed patient and plan with Dr. Dodd.    Mehul Mathew, MS3    Hui Oliver MD  [employer] [facility], PGY-3    Yee Fischer MD  [employer] [facility], PGY-4

## 2023-04-06 ENCOUNTER — TELEPHONE (OUTPATIENT)
Dept: GYNECOLOGY | Facility: CLINIC | Age: 68
End: 2023-04-06
Payer: COMMERCIAL

## 2023-04-06 DIAGNOSIS — N94.89 ADNEXAL MASS: Primary | ICD-10-CM

## 2023-04-06 NOTE — TELEPHONE ENCOUNTER
Called patient and notified her of order in for MRI pelvis sent for [facility] because of open MRI.  She will call us after she gets date so we can schedule a telemed for results.

## 2023-04-12 DIAGNOSIS — N94.89 ADNEXAL MASS: Primary | ICD-10-CM

## 2023-04-14 ENCOUNTER — TELEPHONE (OUTPATIENT)
Dept: GYNECOLOGY | Facility: CLINIC | Age: 68
End: 2023-04-14
Payer: COMMERCIAL

## 2023-04-14 RX ORDER — DIAZEPAM 5 MG/1
5 TABLET ORAL ONCE
Qty: 1 TABLET | Refills: 0 | Status: SHIPPED | OUTPATIENT
Start: 2023-04-14 | End: 2023-05-22

## 2023-04-14 NOTE — TELEPHONE ENCOUNTER
----- Message from Meche Steele MD sent at 4/13/2023  6:48 PM CDT -----  Regarding:  rx  She is requesting a rx for a sedative for her MRI. We cannot send online rx, but we can write a paper rx and she can pick it up at the clinic. Please call let her know she will have to come in and pick it up. We can print it tomorrow and leave it at clinic for her to .    Thanks!  Dr Steele wrote a rx for valium one dose.  Patient will pick it up next week.  I will lock it up in my file cabinet for safety

## 2023-04-15 PROBLEM — Z15.09 MONOALLELIC MUTATION OF PALB2 GENE: Status: ACTIVE | Noted: 2023-04-15

## 2023-04-15 PROBLEM — Z85.038 H/O COLON CANCER, STAGE III: Status: ACTIVE | Noted: 2023-04-15

## 2023-04-15 PROBLEM — Z15.89 MONOALLELIC MUTATION OF PALB2 GENE: Status: ACTIVE | Noted: 2023-04-15

## 2023-04-15 PROBLEM — Z15.01 MONOALLELIC MUTATION OF PALB2 GENE: Status: ACTIVE | Noted: 2023-04-15

## 2023-04-15 PROBLEM — N94.89 ADNEXAL MASS: Status: ACTIVE | Noted: 2023-04-15

## 2023-04-15 PROBLEM — Z85.3 ENCOUNTER FOR FOLLOW-UP SURVEILLANCE OF BREAST CANCER: Status: ACTIVE | Noted: 2023-04-15

## 2023-04-15 PROBLEM — Z17.0 INFILTRATING DUCTAL CARCINOMA OF LEFT BREAST, ESTROGEN RECEPTOR POSITIVE, STAGE 1: Status: ACTIVE | Noted: 2023-04-15

## 2023-04-15 PROBLEM — Z85.038 ENCOUNTER FOR FOLLOW-UP SURVEILLANCE OF COLON CANCER: Status: ACTIVE | Noted: 2023-04-15

## 2023-04-15 PROBLEM — C50.912 INFILTRATING DUCTAL CARCINOMA OF LEFT BREAST, ESTROGEN RECEPTOR POSITIVE, STAGE 1: Status: ACTIVE | Noted: 2023-04-15

## 2023-04-15 PROBLEM — Z08 ENCOUNTER FOR FOLLOW-UP SURVEILLANCE OF COLON CANCER: Status: ACTIVE | Noted: 2023-04-15

## 2023-04-15 PROBLEM — Z08 ENCOUNTER FOR FOLLOW-UP SURVEILLANCE OF BREAST CANCER: Status: ACTIVE | Noted: 2023-04-15

## 2023-04-16 NOTE — PROGRESS NOTES
History:  Past Medical History:   Diagnosis Date    Asthma     Breast cancer     Colon cancer 2008    colon    Essential (primary) hypertension     GERD (gastroesophageal reflux disease)     Hayfever     Obese    Social history (2023): Lives in [place], [place].  .  Has 3 children.  Does not work.  Retired .  Smoked 30 cigarettes every week for 10 years; quit 35 years ago.  No alcohol or illicit drug abuse.    Family history (2023):  Father  from stomach cancer at age 87   Sister experience breast cancer at age 52   Another sister experience breast cancer in her 30s  Health maintenance (2023):  PCP in [place], [place].  Her 5 year surveillance colonoscopy was due 2022; has not has a gun as of 2023.    Menstrual/Ob gyn history:  Menarche at age 15.  First child at age Menopause at age 49.  No hormone replacement therapy.  Oral contraceptive pills for 12 years.  Bilateral tubal ligation at age 29.  No abortions or miscarriages.  Did not breastfeed her children.    Past Surgical History:   Procedure Laterality Date     SECTION      30years ago     COLON SURGERY      COLONOSCOPY      MOUTH SURGERY      NOSE SURGERY      rotater cuff Right 2006    TUBAL LIGATION        Social History     Socioeconomic History    Marital status:    Occupational History    Occupation: [employer]     Employer: [employer]   Tobacco Use    Smoking status: Never    Smokeless tobacco: Never   Substance and Sexual Activity    Alcohol use: Yes     Comment: rarely     Drug use: No    Sexual activity: Not Currently     Partners: Male     Birth control/protection: None      Family History   Problem Relation Age of Onset    Diabetes Mother     Hypertension Mother     Cancer Father     Cancer Sister         Reason for Follow-up:  -IDC left breast, lumpectomy 2015, mastectomy 2015 (for pos margins of DCIS), 1.6 cm,  grade 3, pT1c pN0, ER pos, VA  pos, HER2 negative, Ki-67 20%, stage IA, Oncotype DX breast recurrence score 17  -PALB2 mutation pos, heterozygous  -history of adenocarcinoma of colon, diagnosed 2008, S/P hemicolectomy, pT3 pN1b M0, 3/14 lymph nodes pos, stage IIIB  -left adnexal mass on pelvic ultrasound    History of Present Illness:   Colon Cancer        Oncologic/Hematologic History:  Oncology History   Infiltrating ductal carcinoma of left breast, estrogen receptor positive, stage 1   5/6/2015 Cancer Staged    Staging form: Breast, AJCC 8th Edition  - Pathologic stage from 5/6/2015: Stage IA (pT1c, pN0, cM0, G3, ER+, ID+, HER2-, Oncotype DX score: 17)       4/15/2023 Initial Diagnosis    Infiltrating ductal carcinoma of left breast, estrogen receptor positive, stage 1       -IDC left breast, lumpectomy 05/06/2015, mastectomy 06/08/2015 (for pos margins of DCIS), 1.6 cm, pT1c pN0, ER pos, ID pos, HER2 negative, Ki-67 20%, stage IA, Oncotype DX breast recurrence score 17  -PALB2 mutation pos, heterozygous  -history of adenocarcinoma of colon, diagnosed 2008, S/P hemicolectomy, pT3 pN1b M0, 3/14 lymph nodes pos, stage IIIB  -left adnexal mass on pelvic ultrasound    Interval History:  [No matching plan found]   [No matching plan found]     09/02/2020:   -08/24/2020: Pelvic ultrasound: Neither ovary seen; no significant abnormalities identified   -08/24/2020: CA-125 level 22.2, normal   Patient interviewed via telemedicine visit.  She was at home in [place], [place].  Doing well.  No symptoms.  No concerns.  No weakness, fatigue, malaise, neurological symptoms, unusual headaches, chest pain, cough, dyspnea, abdominal pain, nausea, vomiting, vaginal bleeding or discharge, etc.  No abnormal bleeding.  No urinary problems.    04/17/2003:   She was lost to follow-up after office visit on 09/02/2020   Now, referred by SKY Bedolla, gyn, to establish continuity of care.  -11/03/2020: CEA level < 1.73, normal; CA-125 level 22.3,  normal  -12/28/2020:  Retroperitoneal ultrasound (renal cyst): Small right renal cyst, 1.2 x 1.4 x 1.1 cm  -03/14/2023: Pelvic ultrasound (PALB2 mutation, history of breast cancer and colon cancer) (comparison: None):  Indeterminate heterogeneous lesion at the left adnexa (5.9 x 4.3 x 4.3 cm).  It is unclear if this is ovarian or related to bowel.  Correlate with physical exam.  Suggest CT of the pelvis with contrast.  -03/14/2023:  Bilateral screening mammogram with tomosynthesis (comparison:  05/21/2020 mammogram):  Right breast:  Negative (BI-RADS 1) (she has history of left mastectomy 06/08/2015 for IDC, DCIS)  -03/14/2023:  CA-125 level 16.8, normal  -04/03/2023: Gyn follow-up:  Given increased risk of ovarian and peritoneal cancer with PALB2 mutation, pelvic MRI scan ordered  -in the past, she declined prophylactic BSO and right mastectomy (of note, she is S/P left mastectomy for DCIS, IDC)  Presents for follow-up visit.  Overall, doing well.  Currently, suffering from allergies.  Some hot flashes, sore throat, headaches, dyspnea, cough, wheezing, swelling in the legs, and some numbness and tingling.  Recent headaches since she started having allergies, 9/10 severity, intermittent, not sustained, without focal neurological symptoms or neurological events.  ECOG 0.  Good appetite.  Good energy.  No new lumps or lymphadenopathy.  No postmenopausal bleeding.  No abdominal pain, nausea, vomiting, diarrhea, constipation, or blood in stool.    Medications:  Current Outpatient Medications on File Prior to Visit   Medication Sig Dispense Refill    albuterol (PROVENTIL/VENTOLIN HFA) 90 mcg/actuation inhaler Inhale into the lungs.      albuterol-ipratropium (DUO-NEB) 2.5 mg-0.5 mg/3 mL nebulizer solution       ALPRAZolam (XANAX) 0.5 MG tablet       cetirizine (ZYRTEC) 10 MG tablet Take 10 mg by mouth.      clotrimazole (LOTRIMIN) 1 % cream Apply topically 2 (two) times daily. For 30 days 45 g 3    COMP-AIR NEBULIZER  COMPRESSOR Neli use as directed      diazePAM (VALIUM) 5 MG tablet Take 1 tablet (5 mg total) by mouth once. Take 10 minutes before MRI for 1 dose 1 tablet 0    doxycycline (VIBRAMYCIN) 100 MG Cap Take 100 mg by mouth 2 (two) times daily.      esomeprazole (NEXIUM) 40 MG capsule Take 40 mg by mouth before breakfast.      fluconazole (DIFLUCAN) 100 MG tablet Take 100 mg by mouth.      fluticasone 50 mcg/actuation DsDv 1 puff by Nasal route once daily.      fluticasone-salmeterol diskus inhaler 500-50 mcg       fluticasone-umeclidin-vilanter (TRELEGY ELLIPTA) 200-62.5-25 mcg inhaler Inhale 1 puff into the lungs once daily.      hydroCHLOROthiazide (HYDRODIURIL) 12.5 MG Tab Take 12.5 mg by mouth daily as needed.      ketorolac (TORADOL) 10 mg tablet Take 10 mg by mouth every 6 (six) hours.      LAGEVRIO, EUA, 200 mg capsule (EUA)       levocetirizine (XYZAL) 5 MG tablet Take 5 mg by mouth every evening.      lisinopriL (PRINIVIL,ZESTRIL) 5 MG tablet Take 5 mg by mouth once daily.      meclizine (ANTIVERT) 50 MG tablet Take 25 mg by mouth 3 (three) times daily as needed.      methylPREDNISolone (MEDROL DOSEPACK) 4 mg tablet TAKE AS DIRECTED ON PACKAGE      montelukast (SINGULAIR) 10 mg tablet Take 10 mg by mouth.      nitrofurantoin, macrocrystal-monohydrate, (MACROBID) 100 MG capsule Take 100 mg by mouth 2 (two) times daily.      pantoprazole (PROTONIX) 40 MG tablet Take 40 mg by mouth.      pravastatin (PRAVACHOL) 40 MG tablet Take 40 mg by mouth.      predniSONE (DELTASONE) 20 MG tablet Take by mouth.      promethazine-dextromethorphan (PROMETHAZINE-DM) 6.25-15 mg/5 mL Syrp SMARTSI-10 Milliliter(s) By Mouth 4 Times Daily PRN      ranitidine (ZANTAC) 150 MG capsule Take 150 mg by mouth nightly.      triamcinolone acetonide 0.1% (KENALOG) 0.1 % paste SMARTSIG:TO TEETH 3 Times Daily       No current facility-administered medications on file prior to visit.       Review of Systems:   All systems reviewed and found to  be negative except for the symptoms detailed above    Physical Examination:   VITAL SIGNS:   Vitals:    04/18/23 1310   BP: 109/70   Pulse: 92   Resp: 20   Temp: 97.7 °F (36.5 °C)     GENERAL:  In no apparent distress.    HEAD:  No signs of head trauma.  EYES:  Pupils are equal.  Extraocular motions intact.    EARS:  Hearing grossly intact.  MOUTH:  Oropharynx is normal.   NECK:  No adenopathy, no JVD.     CHEST:  Chest with clear breath sounds bilaterally.  No wheezes, rales, rhonchi.    CARDIAC:  Regular rate and rhythm.  S1 and S2, without murmurs, gallops, rubs.  VASCULAR:  No Edema.  Peripheral pulses normal and equal in all extremities.  ABDOMEN:  Soft, without detectable tenderness.  No sign of distention.  No   rebound or guarding, and no masses palpated.   Bowel Sounds normal.  MUSCULOSKELETAL:  Good range of motion of all major joints. Extremities without clubbing, cyanosis or edema.    NEUROLOGIC EXAM:  Alert and oriented x 3.  No focal sensory or strength deficits.   Speech normal.  Follows commands.  PSYCHIATRIC:  Mood normal.    No results for input(s): CBC in the last 72 hours.   No results for input(s): CMP in the last 72 hours.     Assessment:  Problem List Items Addressed This Visit          Renal/    Adnexal mass       Oncology    Infiltrating ductal carcinoma of left breast, estrogen receptor positive, stage 1    Monoallelic mutation of PALB2 gene    H/O colon cancer, stage III    Encounter for follow-up surveillance of breast cancer    Encounter for follow-up surveillance of colon cancer     Other Visit Diagnoses       High risk of ovarian cancer        Encounter for colonoscopy due to history of colon cancer        History of breast cancer        PALB2-related breast cancer in female              IDC left breast:  -BI-RADS 0, incomplete left diagnostic mammogram 02/11/2015  -1 cm mass upper outer quadrant left breast on ultrasound 02/24/2015  -also, 1.2 x 0.8 x 0.4 cm mass upper outer  quadrant on ultrasound 02/14/2015  -core biopsy 03/19/2015:  IDC, grade 3, ER > 90%, NC 80%, HER2 2+, HER2 negative by FISH, Ki-67 20%  -left lumpectomy and ALND 05/06/2015   -left breast mastectomy for DCIS margin <1 mm with lumpectomy 06/08/2015  -IDC, pT1c pN0, stage IA, grade 3, 1.6 cm; DCIS, 2.4 cm  -ER 90%, NC 80%, HER2 negative, Ki-67 20%  -Oncotype DX breast recurrence score 17) adjuvant chemotherapy not indicated) no-adjuvant Arimidex started 07/2015; stopped 12/2016 (arthralgias)  -switched to Femara 06/2017; stopped due to intolerance  -genetic analysis pos for heterozygous pathogenic mutation: PALB2 [id number].[id number]  -subsequently, serial surveillance breast imaging negative for recurrence  -right breast negative (BI-RADS 1) on screening mammogram 03/14/2023  -in past, she declined prophylactic BSO and right mastectomy  -noncompliant with follow-up; no follow-up between 09/02/2020-04/18/2023      Genetic analysis pos for heterozygous pathogenic mutation: PALB2 c.[id number]      Gyn surveillance for PALB2 pos mutation:  -in the past, she declined prophylactic BSO and right mastectomy  -pelvic ultrasound 08/24/2020: Neither ovary seen; no significant abnormalities  08/24/2020: CA-125 level normal  -11/03/2020: CEA level < 1.73, normal; CA-125 level 22.3, normal  -12/28/2020:  Retroperitoneal ultrasound (renal cyst): Small right renal cyst, 1.2 x 1.4 x 1.1 cm  -03/14/2023: Pelvic ultrasound (PALB2 mutation, history of breast cancer and colon cancer) (comparison: None):  Indeterminate heterogeneous lesion at the left adnexa (5.9 x 4.3 x 4.3 cm).  It is unclear if this is ovarian or related to bowel.  Correlate with physical exam.  Suggest CT of the pelvis with contrast.  -03/14/2023: CA-125 level normal  -04/03/2003: Gyn follow-up:  Pelvic MRI ordered      Adenocarcinoma of colon:  -diagnosed on colonoscopy in 2008  -S/P hemicolectomy   -moderate differentiated adenocarcinoma, 6.5 cm, pT3, 3/14 lymph nodes pos for  metastatic adenocarcinoma, pT3 pN1b M0, stage IIIB  -S/P adjuvant CAPEOX 11/2008-05/2009  -surveillance colonoscopy 07/18/2017: Negative  -no recurrence/metastases on surveillance CTs abdomen/pelvis 05/17/2019      Plan:  Heterozygous for PALB2 mutation  In the past, declined prophylactic right mastectomy and prophylactic BSO  (Of note, she S/P left mastectomy for IDC and DCIS on 06/08/2015)  -04/18/2023: Once again, encouraged her to have prophylactic right mastectomy done; she is not inclined; therefore, we will continue close surveillance    Noncompliant with follow-up   Was lost to follow-up after 09/02/2020 office visit; no follow-up until 04/18/2023    Surveillance recommendations for breast cancer   S/P left mastectomy for IDC 06/08/2015   At high-risk for right breast cancer  Recommendation:  MRI scan of right breast alternating with mammogram of right breast every 6 months to assess for a new breast primary  -screening mammogram right breast 03/14/2023, was negative (BI-RADS 1)  -04/18/2023: Once again, she has declined prophylactic right mastectomy  >>>  Will need MRI scan of right breast with and without contrast in 6 months (September 2023)  Prophylactic total (simple) right mastectomy recommended which will decrease the likelihood of bilateral breast cancer by 90%, although not altogether eradicate the risk; she has declined prophylactic right mastectomy    For increased risk of ovarian cancer secondary to PALB2 mutation:  Recommended surveillance: Vaginal ultrasound every 6 months with clinical pelvic exam and lab work to include CA-125 level every 6 months (there is insufficient evidence to support risk reduction BSO)  -on pelvic ultrasound 03/14/2023, 5.9 x 4.3 x 4.3 cm left adnexal lesion  -pelvic MRI ordered by gyn   -CA-125 level normal 03/14/2023  -04/18/2023: Once again, she has declined prophylactic bilateral salpingo-oophorectomy    History of stage IIIB colon cancer, diagnosed 2008, S/P  hemicolectomy, S/P adjuvant chemotherapy with CAPEOX 11/03/2008-05/2009  -surveillance colonoscopy 07/18/2017 was negative  >>>  Five year surveillance colonoscopy was due 07/2022  She says that her PCP has referred her [facility]; will send a request to GI  Has completed 5 years of colon cancer surveillance; therefore, no need of routine labs and surveillance CT scans    Follow-up in September, after surveillance MRI scan of right breast with and without contrast, CBC, CMP.    Above discussed at length with her.  All questions answered.    Discussed labs and scans and gave her copies of relevant records.    She understands and agrees with this plan, and was appreciative    Follow-up:  No follow-ups on file.

## 2023-04-17 ENCOUNTER — DOCUMENTATION ONLY (OUTPATIENT)
Dept: HEMATOLOGY/ONCOLOGY | Facility: CLINIC | Age: 68
End: 2023-04-17
Payer: COMMERCIAL

## 2023-04-17 NOTE — NURSING
Contacted patient for pre-visit phone contact. HENRI Gooden introduced self, confirmed appointment on 4/18/23 at 1:00 pm with arrival 15 minutes prior to appointment. Patient reports she is following up with Dr. Amaro as she has not followed her appointments since the pandemic.  Verbalized agreement and no identified needs at this time.    Gemini Deutsch, ERAN, RN

## 2023-04-18 ENCOUNTER — OFFICE VISIT (OUTPATIENT)
Dept: HEMATOLOGY/ONCOLOGY | Facility: CLINIC | Age: 68
End: 2023-04-18
Attending: INTERNAL MEDICINE
Payer: COMMERCIAL

## 2023-04-18 VITALS
OXYGEN SATURATION: 100 % | WEIGHT: 228 LBS | RESPIRATION RATE: 20 BRPM | TEMPERATURE: 98 F | DIASTOLIC BLOOD PRESSURE: 70 MMHG | BODY MASS INDEX: 35.79 KG/M2 | HEART RATE: 92 BPM | HEIGHT: 67 IN | SYSTOLIC BLOOD PRESSURE: 109 MMHG

## 2023-04-18 DIAGNOSIS — Z91.89 HIGH RISK OF OVARIAN CANCER: ICD-10-CM

## 2023-04-18 DIAGNOSIS — Z15.09 MONOALLELIC MUTATION OF PALB2 GENE: ICD-10-CM

## 2023-04-18 DIAGNOSIS — Z17.0 INFILTRATING DUCTAL CARCINOMA OF LEFT BREAST, ESTROGEN RECEPTOR POSITIVE, STAGE 1: ICD-10-CM

## 2023-04-18 DIAGNOSIS — Z15.89 MONOALLELIC MUTATION OF PALB2 GENE: ICD-10-CM

## 2023-04-18 DIAGNOSIS — Z17.0 INFILTRATING DUCTAL CARCINOMA OF LEFT BREAST, ESTROGEN RECEPTOR POSITIVE, STAGE 1: Primary | ICD-10-CM

## 2023-04-18 DIAGNOSIS — Z08 ENCOUNTER FOR FOLLOW-UP SURVEILLANCE OF COLON CANCER: ICD-10-CM

## 2023-04-18 DIAGNOSIS — C50.912 INFILTRATING DUCTAL CARCINOMA OF LEFT BREAST, ESTROGEN RECEPTOR POSITIVE, STAGE 1: Primary | ICD-10-CM

## 2023-04-18 DIAGNOSIS — Z12.11 ENCOUNTER FOR COLONOSCOPY DUE TO HISTORY OF COLON CANCER: ICD-10-CM

## 2023-04-18 DIAGNOSIS — C50.912 INFILTRATING DUCTAL CARCINOMA OF LEFT BREAST, ESTROGEN RECEPTOR POSITIVE, STAGE 1: ICD-10-CM

## 2023-04-18 DIAGNOSIS — Z85.038 ENCOUNTER FOR COLONOSCOPY DUE TO HISTORY OF COLON CANCER: ICD-10-CM

## 2023-04-18 DIAGNOSIS — Z85.3 ENCOUNTER FOR FOLLOW-UP SURVEILLANCE OF BREAST CANCER: ICD-10-CM

## 2023-04-18 DIAGNOSIS — Z85.038 ENCOUNTER FOR FOLLOW-UP SURVEILLANCE OF COLON CANCER: ICD-10-CM

## 2023-04-18 DIAGNOSIS — Z15.01 MONOALLELIC MUTATION OF PALB2 GENE: ICD-10-CM

## 2023-04-18 DIAGNOSIS — N94.89 ADNEXAL MASS: ICD-10-CM

## 2023-04-18 DIAGNOSIS — Z85.3 HISTORY OF BREAST CANCER: ICD-10-CM

## 2023-04-18 DIAGNOSIS — Z85.038 H/O COLON CANCER, STAGE III: ICD-10-CM

## 2023-04-18 DIAGNOSIS — Z08 ENCOUNTER FOR FOLLOW-UP SURVEILLANCE OF BREAST CANCER: ICD-10-CM

## 2023-04-18 DIAGNOSIS — C50.919 PALB2-RELATED BREAST CANCER IN FEMALE: ICD-10-CM

## 2023-04-18 PROCEDURE — 99205 OFFICE O/P NEW HI 60 MIN: CPT | Mod: S$PBB,,, | Performed by: INTERNAL MEDICINE

## 2023-04-18 PROCEDURE — 1159F MED LIST DOCD IN RCRD: CPT | Mod: CPTII,,, | Performed by: INTERNAL MEDICINE

## 2023-04-18 PROCEDURE — 1101F PT FALLS ASSESS-DOCD LE1/YR: CPT | Mod: CPTII,,, | Performed by: INTERNAL MEDICINE

## 2023-04-18 PROCEDURE — 1101F PR PT FALLS ASSESS DOC 0-1 FALLS W/OUT INJ PAST YR: ICD-10-PCS | Mod: CPTII,,, | Performed by: INTERNAL MEDICINE

## 2023-04-18 PROCEDURE — 1160F RVW MEDS BY RX/DR IN RCRD: CPT | Mod: CPTII,,, | Performed by: INTERNAL MEDICINE

## 2023-04-18 PROCEDURE — 3008F BODY MASS INDEX DOCD: CPT | Mod: CPTII,,, | Performed by: INTERNAL MEDICINE

## 2023-04-18 PROCEDURE — 3074F PR MOST RECENT SYSTOLIC BLOOD PRESSURE < 130 MM HG: ICD-10-PCS | Mod: CPTII,,, | Performed by: INTERNAL MEDICINE

## 2023-04-18 PROCEDURE — 99205 PR OFFICE/OUTPT VISIT, NEW, LEVL V, 60-74 MIN: ICD-10-PCS | Mod: S$PBB,,, | Performed by: INTERNAL MEDICINE

## 2023-04-18 PROCEDURE — 3078F PR MOST RECENT DIASTOLIC BLOOD PRESSURE < 80 MM HG: ICD-10-PCS | Mod: CPTII,,, | Performed by: INTERNAL MEDICINE

## 2023-04-18 PROCEDURE — 1125F PR PAIN SEVERITY QUANTIFIED, PAIN PRESENT: ICD-10-PCS | Mod: CPTII,,, | Performed by: INTERNAL MEDICINE

## 2023-04-18 PROCEDURE — 3078F DIAST BP <80 MM HG: CPT | Mod: CPTII,,, | Performed by: INTERNAL MEDICINE

## 2023-04-18 PROCEDURE — 3288F FALL RISK ASSESSMENT DOCD: CPT | Mod: CPTII,,, | Performed by: INTERNAL MEDICINE

## 2023-04-18 PROCEDURE — 1160F PR REVIEW ALL MEDS BY PRESCRIBER/CLIN PHARMACIST DOCUMENTED: ICD-10-PCS | Mod: CPTII,,, | Performed by: INTERNAL MEDICINE

## 2023-04-18 PROCEDURE — 4010F PR ACE/ARB THEARPY RXD/TAKEN: ICD-10-PCS | Mod: CPTII,,, | Performed by: INTERNAL MEDICINE

## 2023-04-18 PROCEDURE — 4010F ACE/ARB THERAPY RXD/TAKEN: CPT | Mod: CPTII,,, | Performed by: INTERNAL MEDICINE

## 2023-04-18 PROCEDURE — 1159F PR MEDICATION LIST DOCUMENTED IN MEDICAL RECORD: ICD-10-PCS | Mod: CPTII,,, | Performed by: INTERNAL MEDICINE

## 2023-04-18 PROCEDURE — 99213 OFFICE O/P EST LOW 20 MIN: CPT | Mod: PBBFAC | Performed by: INTERNAL MEDICINE

## 2023-04-18 PROCEDURE — 3008F PR BODY MASS INDEX (BMI) DOCUMENTED: ICD-10-PCS | Mod: CPTII,,, | Performed by: INTERNAL MEDICINE

## 2023-04-18 PROCEDURE — 3074F SYST BP LT 130 MM HG: CPT | Mod: CPTII,,, | Performed by: INTERNAL MEDICINE

## 2023-04-18 PROCEDURE — 3288F PR FALLS RISK ASSESSMENT DOCUMENTED: ICD-10-PCS | Mod: CPTII,,, | Performed by: INTERNAL MEDICINE

## 2023-04-18 PROCEDURE — 1125F AMNT PAIN NOTED PAIN PRSNT: CPT | Mod: CPTII,,, | Performed by: INTERNAL MEDICINE

## 2023-04-18 RX ORDER — ALBUTEROL SULFATE 90 UG/1
AEROSOL, METERED RESPIRATORY (INHALATION)
COMMUNITY
Start: 2023-04-10

## 2023-04-18 NOTE — Clinical Note
Orders for today:   Refer to gyn for prophylactic bilateral salpingo-oophorectomy   MRI scan of right breast with and without contrast in September  Surveillance pelvic ultrasound deferred to gyn  Refer to GI for surveillance colonoscopy (was supposed to have colonoscopy done in July 2022)  Follow-up in September, after surveillance MRI scan of right breast with and without contrast, CBC, CMP.

## 2023-04-24 ENCOUNTER — HOSPITAL ENCOUNTER (OUTPATIENT)
Dept: RADIOLOGY | Facility: HOSPITAL | Age: 68
Discharge: HOME | End: 2023-04-24
Attending: OBSTETRICS & GYNECOLOGY
Payer: COMMERCIAL

## 2023-04-24 DIAGNOSIS — N94.89 ADNEXAL MASS: ICD-10-CM

## 2023-05-09 ENCOUNTER — TELEPHONE (OUTPATIENT)
Dept: GYNECOLOGY | Facility: CLINIC | Age: 68
End: 2023-05-09

## 2023-05-09 NOTE — TELEPHONE ENCOUNTER
This patient was brought to my attention. Patient has a complex left ovarian mass that gyn ordered a MRI pelvic to be done to further evaluate. Patient attended the MRI appt on 4/24/23 but appt was canceled due to below:  Cancel Rsn: Other (patient extremely claustrophobic; took valium prior to her MRI exam and still could not do her exam)     Oncology recommended vaginal ultrasound every 6 months. Could patient be scheduled for a follow up with us to plan what the next steps should be? Please advise. Thank you.

## 2023-05-22 ENCOUNTER — CLINICAL SUPPORT (OUTPATIENT)
Dept: GYNECOLOGY | Facility: CLINIC | Age: 68
End: 2023-05-22
Payer: COMMERCIAL

## 2023-05-22 DIAGNOSIS — N94.89 ADNEXAL MASS: ICD-10-CM

## 2023-05-22 RX ORDER — BENZONATATE 100 MG/1
100 CAPSULE ORAL
COMMUNITY
Start: 2023-04-18

## 2023-05-22 NOTE — PROGRESS NOTES
Audio Only Telehealth Visit     The patient location is:   The chief complaint leading to consultation is: follow up of adnexal mass, need additional imaging  Visit type: Virtual visit with audio only (telephone)  Total time spent with patient: 20m     The reason for the audio only service rather than synchronous audio and video virtual visit was related to technical difficulties or patient preference/necessity.     Each patient to whom I provide medical services by telemedicine is:  (1) informed of the relationship between the physician and patient and the respective role of any other health care provider with respect to management of the patient; and (2) notified that they may decline to receive medical services by telemedicine and may withdraw from such care at any time. Patient verbally consented to receive this service via voice-only telephone call.       HPI:   Problem List Items Addressed This Visit          Renal/    Adnexal mass     Discussed with patient need for additional imaging to better characterize adnexal mass.   Not a good candidate for MRI given anxiety.  Recommend CTAP, ordered and patient amenable              Patient to return to care after imaging results.       This service was not originating from a related E/M service provided within the previous 7 days nor will  to an E/M service or procedure within the next 24 hours or my soonest available appointment.  Prevailing standard of care was able to be met in this audio-only visit.      Marjorie Mejia MS, MD  [facility] PGY-4  7:04 PM 05/23/2023

## 2023-05-24 NOTE — ASSESSMENT & PLAN NOTE
Discussed with patient need for additional imaging to better characterize adnexal mass.   Not a good candidate for MRI given anxiety.  Recommend CTAP, ordered and patient amenable

## 2023-05-26 ENCOUNTER — TELEPHONE (OUTPATIENT)
Dept: GYNECOLOGY | Facility: CLINIC | Age: 68
End: 2023-05-26

## 2023-05-26 NOTE — TELEPHONE ENCOUNTER
Per Dr. Mejia to schedule patients follow up with us. Scheduled patient for a telemedicine appt at 6/5/23 at 7:45 AM. I scheduled it after her CT test so we can go over those results as well. Please call patient and let her know about appt date and time. Thank you.

## 2023-06-05 ENCOUNTER — TELEPHONE (OUTPATIENT)
Dept: GYNECOLOGY | Facility: CLINIC | Age: 68
End: 2023-06-05
Payer: COMMERCIAL

## 2023-06-05 NOTE — TELEPHONE ENCOUNTER
Called pt for telemed visit today. She did not get jenna CT done yet. I then informed her with her new appointment on 6/26/23. She verbalized understanding.

## 2023-06-26 ENCOUNTER — TELEPHONE (OUTPATIENT)
Dept: GYNECOLOGY | Facility: CLINIC | Age: 68
End: 2023-06-26
Payer: COMMERCIAL

## 2023-06-26 DIAGNOSIS — R19.00 PELVIC MASS: Primary | ICD-10-CM

## 2023-06-26 NOTE — TELEPHONE ENCOUNTER
rosy Villavicencio and she sent info on CT abd/pelvis approved (chest denied but patient had one done already in June.).  Auth # [id number], dates 6/14/23-9/12/23, tracking [id number],  Called Ashlyn in centralized scheduling and scheduled for Wed, 6/28/23 at 10:30am, NPO 6 hours.  Patient called and notified, rescheduled appt to come in on Friday 6/30/23 at 8am

## 2023-06-28 ENCOUNTER — HOSPITAL ENCOUNTER (OUTPATIENT)
Dept: RADIOLOGY | Facility: HOSPITAL | Age: 68
Discharge: HOME | End: 2023-06-28
Attending: OBSTETRICS & GYNECOLOGY
Payer: COMMERCIAL

## 2023-06-28 DIAGNOSIS — R19.00 PELVIC MASS: ICD-10-CM

## 2023-06-28 LAB
CREAT SERPL-MCNC: 0.88 MG/DL (ref 0.55–1.02)
GFR SERPLBLD CREATININE-BSD FMLA CKD-EPI: >60 MLS/MIN/1.73/M2

## 2023-06-28 PROCEDURE — 25500020 PHARM REV CODE 255: Performed by: OBSTETRICS & GYNECOLOGY

## 2023-06-28 PROCEDURE — 74177 CT ABD & PELVIS W/CONTRAST: CPT | Mod: TC

## 2023-06-28 PROCEDURE — 82565 ASSAY OF CREATININE: CPT | Performed by: OBSTETRICS & GYNECOLOGY

## 2023-06-28 RX ADMIN — IOHEXOL 100 ML: 350 INJECTION, SOLUTION INTRAVENOUS at 12:06

## 2023-06-30 ENCOUNTER — OFFICE VISIT (OUTPATIENT)
Dept: GYNECOLOGY | Facility: CLINIC | Age: 68
End: 2023-06-30
Payer: COMMERCIAL

## 2023-06-30 VITALS
TEMPERATURE: 98 F | SYSTOLIC BLOOD PRESSURE: 144 MMHG | DIASTOLIC BLOOD PRESSURE: 83 MMHG | WEIGHT: 234.19 LBS | HEART RATE: 68 BPM | RESPIRATION RATE: 20 BRPM | BODY MASS INDEX: 36.76 KG/M2 | HEIGHT: 67 IN | OXYGEN SATURATION: 100 %

## 2023-06-30 DIAGNOSIS — Z15.09 MONOALLELIC MUTATION OF PALB2 GENE: Primary | ICD-10-CM

## 2023-06-30 DIAGNOSIS — Z15.89 MONOALLELIC MUTATION OF PALB2 GENE: Primary | ICD-10-CM

## 2023-06-30 DIAGNOSIS — N94.89 ADNEXAL MASS: ICD-10-CM

## 2023-06-30 DIAGNOSIS — Z15.01 MONOALLELIC MUTATION OF PALB2 GENE: Primary | ICD-10-CM

## 2023-06-30 PROCEDURE — 99214 OFFICE O/P EST MOD 30 MIN: CPT | Mod: PBBFAC

## 2023-06-30 RX ORDER — LEVOCETIRIZINE DIHYDROCHLORIDE 5 MG/1
TABLET, FILM COATED ORAL
COMMUNITY
Start: 2023-06-27

## 2023-06-30 RX ORDER — NYSTATIN 100000 [USP'U]/ML
SUSPENSION ORAL
COMMUNITY
Start: 2023-06-08 | End: 2023-12-13

## 2023-06-30 NOTE — PROGRESS NOTES
"  [facility] GYNECOLOGY CLINIC NOTE     Anny Ferreira is a 67 y.o.  presenting to GYN clinic for CT review.     Patient with PALB2 mutation, with notable personal h/o breast cancer in  s/p left mastectomy as well as colon cancer in  s/p colon resection and chemotherapy, currently in remission for both cancers. Seen by GYN NP in [facility] after being lost to follow up since  iso COVID. TVUS 2023 was ordered per last [facility] recommendations, which demonstrated indeterminate heterogenous structure at L adnexa.    F/up CT 2023 reported "No correlate masses identified within the pelvis.  Possibly this represented a loop of bowel conglomerated together."    Discussed results with patients.     Additionally, patient had discussed rrBSO by [facility] iso PALB2 mutation and declined in the past- rediscussed today, patient states she declines and does not want treatment unless there is an active problem.    OB History          3    Para   3    Term   3       0    AB   0    Living   3         SAB        IAB        Ectopic        Multiple        Live Births                    Past Medical History:   Diagnosis Date    Asthma     Breast cancer     Colon cancer 2008    colon    Essential (primary) hypertension     GERD (gastroesophageal reflux disease)     Hayfever     Obese       Past Surgical History:   Procedure Laterality Date     SECTION      30years ago     COLON SURGERY      COLONOSCOPY      MOUTH SURGERY      NOSE SURGERY      rotater cuff Right 2006    TUBAL LIGATION        Current Outpatient Medications   Medication Instructions    albuterol (PROVENTIL/VENTOLIN HFA) 90 mcg/actuation inhaler Inhalation    albuterol-ipratropium (DUO-NEB) 2.5 mg-0.5 mg/3 mL nebulizer solution No dose, route, or frequency recorded.    ALPRAZolam (XANAX) 0.5 MG tablet No dose, route, or frequency recorded.    benzonatate (TESSALON) 100 mg, Oral    cetirizine (ZYRTEC) 10 mg, Oral    clotrimazole " "(LOTRIMIN) 1 % cream Topical (Top), 2 times daily, For 30 days    COMP-AIR NEBULIZER COMPRESSOR Neli use as directed    esomeprazole (NEXIUM) 40 mg, Before breakfast    levocetirizine (XYZAL) 5 MG tablet No dose, route, or frequency recorded.    lisinopriL (PRINIVIL,ZESTRIL) 5 mg, Oral, Daily    meclizine (ANTIVERT) 25 mg, Oral, 3 times daily PRN    montelukast (SINGULAIR) 10 mg, Oral    nystatin (MYCOSTATIN) 100,000 unit/mL suspension Oral    pantoprazole (PROTONIX) 40 mg, Oral    pravastatin (PRAVACHOL) 40 mg, Oral    predniSONE (DELTASONE) 20 MG tablet Oral    promethazine-dextromethorphan (PROMETHAZINE-DM) 6.25-15 mg/5 mL Syrp SMARTSI-10 Milliliter(s) By Mouth 4 Times Daily PRN     Social History     Tobacco Use    Smoking status: Never    Smokeless tobacco: Never   Substance Use Topics    Alcohol use: Not Currently     Comment: rarely     Drug use: No       Review of Systems  Pertinent items are noted in HPI.     Objective:     BP (!) 144/83 (BP Location: Left arm, Patient Position: Sitting, BP Method: Medium (Automatic))   Pulse 68   Temp 97.5 °F (36.4 °C) (Oral)   Resp 20   Ht 5' 7" (1.702 m)   Wt 106.2 kg (234 lb 3.2 oz)   SpO2 100%   BMI 36.68 kg/m²   Physical Exam:  Gen: Well-nourished, well-developed female appearing stated age. Alert, cooperative, in no acute distress.  CV: rrr, no murmurs/rubs/gallops  Chest: ctabl, no wheezes/rales/rhonchi  Abdomen: Soft, non-tender, no masses.  Extrem: Extremities normal, atraumatic, non-tender calves.    Relevant Labs:   3/2023:  - 16.8    Relevant Imaging:  TVUS 2023  FINDINGS:  UTERUS/CERVIX:     Size: 9.2 x 4.8 x 4.2 cm     Masses: Heterogeneous myometrium.     Endometrium: 5.5 mm.     Nabothian cysts of the cervix.     RIGHT OVARY:     Attempted visualization of the right ovary.  Right ovary not seen for unknown reason, possibly due to shadowing bowel-gas and/or body habitus.     LEFT OVARY:     Indeterminate heterogeneous structure at the " left adnexa measuring 5.9 x 4.3 x 4.3 cm.  Mixed internal echogenicity within this lesion including hyperechoic, shadowing areas.     FREE FLUID:     None     Impression:     Indeterminate heterogeneous lesion at the left adnexa.  It is unclear if this is ovarian or related to bowel.  Correlate with physical exam.  Suggest CT of the pelvis with contrast.    CTAP 2023  FINDINGS:  01. HEPATOBILIARY: No focal hepatic lesion is identified, The gallbladder is normal.     02. SPLEEN: Normal     03. PANCREAS: No focal masses or ductal dilatation.     04. ADRENALS: No adrenal nodules.     05. KIDNEYS: Venous iliac stent is noted.  The left kidney demonstrates no stone, hydronephrosis, or hydroureter. No focal mass identified.     06. LYMPHADENOPATHY/RETROPERITONEUM: There is no retroperitoneal lymphadenopathy. The abdominal aorta is normal in course and caliber.     07. BOWEL: No acute bowel related abnormalities. Postsurgical changes at the rectum.     08. PELVIC VISCERA: Normal. No pelvic mass.     09. PELVIC LYMPH NODES: No lymphadenopathy.     10. PERITONEUM/ABDOMINAL WALL: No ascites or implant.     11. SKELETAL: No aggressive appearing lytic/blastic lesion. No acute fractures, subluxations or dislocations. There is enlargement of the musculature anterior to the femoral head (series 2, image 148).  This is unchanged from 2013 and statistically benign.     12. LUNG BASES: The visualized lungs are unremarkable.     Impression:     No correlate masses identified within the pelvis.  Possibly this represented a loop of bowel conglomerated together.    Assessment:       67 y.o.  here for review of imaging results.  1. Monoallelic mutation of PALB2 gene  US Pelvis Comp with Transvag NON-OB (xpd          2. Adnexal mass          Plan:     Problem List Items Addressed This Visit          Renal/    RESOLVED: Adnexal mass       Oncology    Monoallelic mutation of PALB2 gene - Primary     q6mo  and TVUS  recommended as surveillance for increased risk ovarian cancer iso PALB2 mutation         Relevant Orders    US Pelvis Comp with Transvag NON-OB (xpd         Adnexal mass on US-  not seen on f/up CT. Suspect US finding related to post surgical changes (colectomy). problem resolved    Return to clinic Feb 2024 for annual with NP    Discussed patient and plan with Dr Emily Steele MD  [facility] PGY2  07/01/2023 9:54 AM

## 2023-07-01 PROBLEM — N94.89 ADNEXAL MASS: Status: RESOLVED | Noted: 2023-04-15 | Resolved: 2023-07-01

## 2023-07-04 ENCOUNTER — TELEPHONE (OUTPATIENT)
Dept: OBSTETRICS AND GYNECOLOGY | Facility: HOSPITAL | Age: 68
End: 2023-07-04
Payer: COMMERCIAL

## 2023-07-14 DIAGNOSIS — Z12.11 SCREENING FOR COLON CANCER: Primary | ICD-10-CM

## 2023-07-14 RX ORDER — SODIUM, POTASSIUM,MAG SULFATES 17.5-3.13G
SOLUTION, RECONSTITUTED, ORAL ORAL
Qty: 1 KIT | Refills: 0 | Status: SHIPPED | OUTPATIENT
Start: 2023-07-14 | End: 2023-12-13

## 2023-07-17 PROBLEM — Z85.3 ENCOUNTER FOR FOLLOW-UP SURVEILLANCE OF BREAST CANCER: Status: RESOLVED | Noted: 2023-04-15 | Resolved: 2023-07-17

## 2023-07-17 PROBLEM — Z08 ENCOUNTER FOR FOLLOW-UP SURVEILLANCE OF BREAST CANCER: Status: RESOLVED | Noted: 2023-04-15 | Resolved: 2023-07-17

## 2023-07-17 PROBLEM — Z08 ENCOUNTER FOR FOLLOW-UP SURVEILLANCE OF COLON CANCER: Status: RESOLVED | Noted: 2023-04-15 | Resolved: 2023-07-17

## 2023-07-17 PROBLEM — Z85.038 ENCOUNTER FOR FOLLOW-UP SURVEILLANCE OF COLON CANCER: Status: RESOLVED | Noted: 2023-04-15 | Resolved: 2023-07-17

## 2023-07-24 DIAGNOSIS — Z17.0 INFILTRATING DUCTAL CARCINOMA OF LEFT BREAST, ESTROGEN RECEPTOR POSITIVE, STAGE 1: Primary | ICD-10-CM

## 2023-07-24 DIAGNOSIS — Z85.038 H/O COLON CANCER, STAGE III: ICD-10-CM

## 2023-07-24 DIAGNOSIS — C50.912 INFILTRATING DUCTAL CARCINOMA OF LEFT BREAST, ESTROGEN RECEPTOR POSITIVE, STAGE 1: Primary | ICD-10-CM

## 2023-07-24 DIAGNOSIS — C18.9 MALIGNANT NEOPLASM OF COLON, UNSPECIFIED PART OF COLON: ICD-10-CM

## 2023-08-02 ENCOUNTER — TELEPHONE (OUTPATIENT)
Dept: HEMATOLOGY/ONCOLOGY | Facility: CLINIC | Age: 68
End: 2023-08-02
Payer: COMMERCIAL

## 2023-08-02 NOTE — TELEPHONE ENCOUNTER
----- Message from ALANA Napoles sent at 7/26/2023  4:05 PM CDT -----  Regarding: RE: MRI Breast w/wo Contrast, w/CAD, Bilateral  We can order medications for her to take prior to MRI if patient would like   ----- Message -----  From: Charlotte Ugalde MA  Sent: 7/25/2023   3:21 PM CDT  To: Cesar Amaro MD  Subject: FW: MRI Breast w/wo Contrast, w/CAD, Bilater#      ----- Message -----  From: Hortensia Goodrich  Sent: 7/25/2023   2:10 PM CDT  To: Sondra HAYNES Staff  Subject: MRI Breast w/wo Contrast, w/CAD, Bilateral       Pt stated she can't have MRI without sedation,stated they usually do CT's.      Thank You,Hortensia Goodrich  Centralized Scheduling Dept

## 2023-09-05 RX ORDER — HYDROCHLOROTHIAZIDE 12.5 MG/1
12.5 CAPSULE ORAL DAILY
COMMUNITY

## 2023-09-05 RX ORDER — FLUTICASONE FUROATE, UMECLIDINIUM BROMIDE AND VILANTEROL TRIFENATATE 200; 62.5; 25 UG/1; UG/1; UG/1
1 POWDER RESPIRATORY (INHALATION) DAILY
COMMUNITY

## 2023-09-26 ENCOUNTER — ANESTHESIA EVENT (OUTPATIENT)
Dept: ENDOSCOPY | Facility: HOSPITAL | Age: 68
End: 2023-09-26
Payer: COMMERCIAL

## 2023-09-26 NOTE — ANESTHESIA PREPROCEDURE EVALUATION
"                                                                                                             2023  Anny Ferreira is a 67 y.o., female for CLN screening      History of  GERD, CLN CA s/p colectomy, Breast CA, Obesity, HTN (? Lisinopril)    Vitals:    23 0904   BP: (!) 152/77   Pulse: 79   Resp: 18   Temp: 37 °C (98.6 °F)   TempSrc: Oral   SpO2: 96%   Weight: 107 kg (236 lb)   Height: 5' 7" (1.702 m)         Active Ambulatory Problems     Diagnosis Date Noted    Hayfever     GERD (gastroesophageal reflux disease)     Obese     Colon cancer     Infiltrating ductal carcinoma of left breast, estrogen receptor positive, stage 1 04/15/2023    Monoallelic mutation of PALB2 gene 04/15/2023    H/O colon cancer, stage III 04/15/2023     Resolved Ambulatory Problems     Diagnosis Date Noted    Adnexal mass 04/15/2023    Encounter for follow-up surveillance of breast cancer 04/15/2023    Encounter for follow-up surveillance of colon cancer 04/15/2023     Past Medical History:   Diagnosis Date    Asthma     Breast cancer     Essential (primary) hypertension        Past Surgical History:   Procedure Laterality Date     SECTION      30years ago     COLON SURGERY      COLONOSCOPY      MOUTH SURGERY      NOSE SURGERY      rotater cuff Right 2006    TUBAL LIGATION         Lab Results   Component Value Date    WBC 6.5 2020    HGB 14.2 2020    HCT 44.2 2020     2020    CHOL 216 (H) 12/15/2020    TRIG 159 (H) 12/15/2020    HDL 32 (L) 12/15/2020    ALT 18 2020    AST 18 2020     2020    K 4.5 2020    CREATININE 0.88 2023    BUN 11.0 2020    CO2 27 2020    TSH 0.551 2020    INR 0.96 2020    HGBA1C 5.6 2020     .      Pre-op Assessment    I have reviewed the Patient Summary Reports.     I have reviewed the Nursing Notes. I have reviewed the NPO Status.   I have reviewed the " Medications.     Review of Systems  Anesthesia Hx:  No problems with previous Anesthesia  History of prior surgery of interest to airway management or planning: Denies Family Hx of Anesthesia complications.   Denies Personal Hx of Anesthesia complications.   Hematology/Oncology:  Hematology Normal   Oncology Normal     EENT/Dental:EENT/Dental Normal   Cardiovascular:  Cardiovascular Normal     Pulmonary:  Pulmonary Normal    Renal/:  Renal/ Normal     Hepatic/GI:  Hepatic/GI Normal    Musculoskeletal:  Musculoskeletal Normal    Neurological:  Neurology Normal    Endocrine:  Endocrine Normal    Dermatological:  Skin Normal    Psych:  Psychiatric Normal           Physical Exam  General: Well nourished, Cooperative, Alert and Oriented    Airway:  Mallampati: I / I  Mouth Opening: Normal  TM Distance: Normal  Tongue: Normal  Neck ROM: Normal ROM    Dental:  Dentures        Anesthesia Plan  Type of Anesthesia, risks & benefits discussed:    Anesthesia Type: Gen Natural Airway  Intra-op Monitoring Plan: Standard ASA Monitors  Post Op Pain Control Plan: IV/PO Opioids PRN  (medical reason for not using multimodal pain management)  Induction:  IV  Informed Consent: Informed consent signed with the Patient and all parties understand the risks and agree with anesthesia plan.  All questions answered. Patient consented to blood products? No  ASA Score: 3  Day of Surgery Review of History & Physical: H&P Update referred to the surgeon/provider.    Ready For Surgery From Anesthesia Perspective.     .

## 2023-09-28 ENCOUNTER — HOSPITAL ENCOUNTER (OUTPATIENT)
Facility: HOSPITAL | Age: 68
Discharge: HOME | End: 2023-09-28
Attending: INTERNAL MEDICINE | Admitting: INTERNAL MEDICINE
Payer: COMMERCIAL

## 2023-09-28 ENCOUNTER — ANESTHESIA (OUTPATIENT)
Dept: ENDOSCOPY | Facility: HOSPITAL | Age: 68
End: 2023-09-28
Payer: COMMERCIAL

## 2023-09-28 DIAGNOSIS — Z85.038 H/O COLON CANCER, STAGE III: ICD-10-CM

## 2023-09-28 DIAGNOSIS — Z90.49 STATUS POST PARTIAL RESECTION OF COLON: Primary | ICD-10-CM

## 2023-09-28 PROCEDURE — G0105 COLORECTAL SCRN; HI RISK IND: HCPCS | Performed by: INTERNAL MEDICINE

## 2023-09-28 PROCEDURE — 63600175 PHARM REV CODE 636 W HCPCS: Performed by: NURSE ANESTHETIST, CERTIFIED REGISTERED

## 2023-09-28 PROCEDURE — 37000009 HC ANESTHESIA EA ADD 15 MINS: Performed by: INTERNAL MEDICINE

## 2023-09-28 PROCEDURE — 25000003 PHARM REV CODE 250: Performed by: NURSE ANESTHETIST, CERTIFIED REGISTERED

## 2023-09-28 PROCEDURE — G0105 COLORECTAL SCRN; HI RISK IND: HCPCS | Mod: ,,, | Performed by: INTERNAL MEDICINE

## 2023-09-28 PROCEDURE — 37000008 HC ANESTHESIA 1ST 15 MINUTES: Performed by: INTERNAL MEDICINE

## 2023-09-28 PROCEDURE — 63600175 PHARM REV CODE 636 W HCPCS: Performed by: INTERNAL MEDICINE

## 2023-09-28 PROCEDURE — D9220A PRA ANESTHESIA: Mod: ,,, | Performed by: NURSE ANESTHETIST, CERTIFIED REGISTERED

## 2023-09-28 PROCEDURE — G0105 COLORECTAL SCRN; HI RISK IND: ICD-10-PCS | Mod: ,,, | Performed by: INTERNAL MEDICINE

## 2023-09-28 PROCEDURE — D9220A PRA ANESTHESIA: ICD-10-PCS | Mod: ,,, | Performed by: NURSE ANESTHETIST, CERTIFIED REGISTERED

## 2023-09-28 RX ORDER — LIDOCAINE HYDROCHLORIDE 20 MG/ML
INJECTION INTRAVENOUS
Status: DISCONTINUED | OUTPATIENT
Start: 2023-09-28 | End: 2023-09-28

## 2023-09-28 RX ORDER — LIDOCAINE HYDROCHLORIDE 10 MG/ML
1 INJECTION, SOLUTION EPIDURAL; INFILTRATION; INTRACAUDAL; PERINEURAL ONCE
Status: CANCELLED | OUTPATIENT
Start: 2023-09-28 | End: 2023-09-28

## 2023-09-28 RX ORDER — PROPOFOL 10 MG/ML
VIAL (ML) INTRAVENOUS
Status: DISCONTINUED | OUTPATIENT
Start: 2023-09-28 | End: 2023-09-28

## 2023-09-28 RX ORDER — SODIUM CHLORIDE, SODIUM LACTATE, POTASSIUM CHLORIDE, CALCIUM CHLORIDE 600; 310; 30; 20 MG/100ML; MG/100ML; MG/100ML; MG/100ML
INJECTION, SOLUTION INTRAVENOUS CONTINUOUS
Status: DISCONTINUED | OUTPATIENT
Start: 2023-09-28 | End: 2023-09-28 | Stop reason: HOSPADM

## 2023-09-28 RX ADMIN — PROPOFOL 40 MG: 10 INJECTION, EMULSION INTRAVENOUS at 10:09

## 2023-09-28 RX ADMIN — PROPOFOL 50 MG: 10 INJECTION, EMULSION INTRAVENOUS at 09:09

## 2023-09-28 RX ADMIN — PROPOFOL 30 MG: 10 INJECTION, EMULSION INTRAVENOUS at 09:09

## 2023-09-28 RX ADMIN — SODIUM CHLORIDE, POTASSIUM CHLORIDE, SODIUM LACTATE AND CALCIUM CHLORIDE: 600; 310; 30; 20 INJECTION, SOLUTION INTRAVENOUS at 09:09

## 2023-09-28 RX ADMIN — PROPOFOL 30 MG: 10 INJECTION, EMULSION INTRAVENOUS at 10:09

## 2023-09-28 RX ADMIN — LIDOCAINE HYDROCHLORIDE 50 MG: 20 INJECTION INTRAVENOUS at 09:09

## 2023-09-28 RX ADMIN — PROPOFOL 20 MG: 10 INJECTION, EMULSION INTRAVENOUS at 10:09

## 2023-09-28 NOTE — ANESTHESIA POSTPROCEDURE EVALUATION
Anesthesia Post Evaluation    Patient: Anny DIAZ Dairy    Procedure(s) Performed: Procedure(s) (LRB):  COLONOSCOPY (N/A)    Final Anesthesia Type: general      Patient location during evaluation: GI PACU  Patient participation: Yes- Able to Participate  Level of consciousness: awake and alert  Pain management: adequate  Airway patency: patent      Anesthetic complications: no      Cardiovascular status: hemodynamically stable  Respiratory status: unassisted, room air and spontaneous ventilation  Hydration status: euvolemic  Follow-up not needed.          No case tracking events are documented in the log.      Pain/Ileana Score: No data recorded       You can access the FollowMyHealth Patient Portal offered by [facility] by registering at the following website: [email or web address]. By joining [employer]’s FollowMyHealth portal, you will also be able to view your health information using other applications (apps) compatible with our system.

## 2023-09-28 NOTE — PROVATION PATIENT INSTRUCTIONS
Discharge Summary/Instructions after an Endoscopic Procedure  Patient Name: Anny Ferreira  Patient MRN: [id number]  Patient YOB: 1955  Thursday, September 28, 2023  Raffy Osorio MD  Dear patient,  As a result of recent federal legislation (The Federal Cures Act), you may   receive lab or pathology results from your procedure in your [facility]   account before your physician is able to contact you. Your physician or   their representative will relay the results to you with their   recommendations at their soonest availability.  Thank you,  RESTRICTIONS:  During your procedure today, you received medications for sedation.  These   medications may affect your judgment, balance and coordination.  Therefore,   for 24 hours, you have the following restrictions:   - DO NOT drive a car, operate machinery, make legal/financial decisions,   sign important papers or drink alcohol.    ACTIVITY:  Today: no heavy lifting, straining or running due to procedural   sedation/anesthesia.  The following day: return to full activity including work.  DIET:  Eat and drink normally unless instructed otherwise.     TREATMENT FOR COMMON SIDE EFFECTS:  - Mild abdominal pain, nausea, belching, bloating or excessive gas:  rest,   eat lightly and use a heating pad.  - Sore Throat: treat with throat lozenges and/or gargle with warm salt   water.  - Because air was used during the procedure, expelling large amounts of air   from your rectum or belching is normal.  - If a bowel prep was taken, you may not have a bowel movement for 1-3 days.    This is normal.  SYMPTOMS TO WATCH FOR AND REPORT TO YOUR PHYSICIAN:  1. Abdominal pain or bloating, other than gas cramps.  2. Chest pain.  3. Back pain.  4. Signs of infection such as: chills or fever occurring within 24 hours   after the procedure.  5. Rectal bleeding, which would show as bright red, maroon, or black stools.   (A tablespoon of blood from the rectum is not serious,  especially if   hemorrhoids are present.)  6. Vomiting.  7. Weakness or dizziness.  GO DIRECTLY TO THE NEAREST EMERGENCY ROOM IF YOU HAVE ANY OF THE FOLLOWING:      Difficulty breathing              Chills and/or fever over 101 F   Persistent vomiting and/or vomiting blood   Severe abdominal pain   Severe chest pain   Black, tarry stools   Bleeding- more than one tablespoon   Any other symptom or condition that you feel may need urgent attention  Your doctor recommends these additional instructions:  If any biopsies were taken, your doctors clinic will contact you in 1 to 2   weeks with any results.  - Discharge patient to home (ambulatory).   - Resume previous diet today.   - Repeat colonoscopy in 5 years for surveillance.  For questions, problems or results please call your physician - Raffy Osorio MD at Work:  [phone].  [facility] , EMERGENCY ROOM PHONE NUMBER: [phone]  IF A COMPLICATION OR EMERGENCY SITUATION ARISES AND YOU ARE UNABLE TO REACH   YOUR PHYSICIAN - GO DIRECTLY TO THE EMERGENCY ROOM.  MD Raffy Garcia MD  9/28/2023 10:30:18 AM  This report has been verified and signed electronically.  Dear patient,  As a result of recent federal legislation (The Federal Cures Act), you may   receive lab or pathology results from your procedure in your [facility]   account before your physician is able to contact you. Your physician or   their representative will relay the results to you with their   recommendations at their soonest availability.  Thank you,  PROVATION

## 2023-09-28 NOTE — H&P
[facility]  Gastroenterology  H&P    Patient Name: Anny Ferreira  MRN: [id number]  Admission Date: 2023  Code Status: No Order    Attending Provider: Raffy Osorio MD   Primary Care Physician: Raffy Sandhu MD  Principal Problem:<principal problem not specified>    Subjective:     History of Present Illness: 67y F w/pmhx of PALB2, Stage III Colon adenocarcinoma s/p Resection (L sebastián), and Invasive ductal carcinoma of the L breast s/p L mastectomy presenting for surveillance colonoscopy. She states he has been in her usual state of health since her last colonoscopy.She is tolerating regular food, denies any PO intake, denies any obvious blood in her stool, but does state that her stool is some times more black in color.     Past Medical History:   Diagnosis Date    Asthma     Breast cancer     Colon cancer 2008    colon    Essential (primary) hypertension     GERD (gastroesophageal reflux disease)     Hayfever     Obese        Past Surgical History:   Procedure Laterality Date     SECTION      30years ago     COLON SURGERY      COLONOSCOPY      MOUTH SURGERY      NOSE SURGERY      rotater cuff Right 2006    TUBAL LIGATION         Review of patient's allergies indicates:   Allergen Reactions    Adhesive Blisters     tegaderm    Morphine     Aspirin     Lortab [hydrocodone-acetaminophen] Other (See Comments)     hallucinations            Tramadol      itching     Family History       Problem Relation (Age of Onset)    Cancer Father, Sister    Diabetes Mother    Hypertension Mother          Tobacco Use    Smoking status: Never    Smokeless tobacco: Never   Substance and Sexual Activity    Alcohol use: Not Currently     Comment: rarely     Drug use: No    Sexual activity: Not Currently     Review of Systems   All other systems reviewed and are negative.    Objective:     Vital Signs (Most Recent):    Vital Signs (24h Range):           There is no height or  weight on file to calculate BMI.    No intake or output data in the 24 hours ending 09/28/23 0844    Lines/Drains/Airways       None                   Physical Exam  Constitutional:       Appearance: Normal appearance.   HENT:      Head: Normocephalic.   Eyes:      Extraocular Movements: Extraocular movements intact.   Cardiovascular:      Rate and Rhythm: Normal rate and regular rhythm.      Pulses: Normal pulses.   Pulmonary:      Effort: Pulmonary effort is normal.   Abdominal:      General: Abdomen is flat.      Palpations: Abdomen is soft.   Neurological:      Mental Status: She is alert.       Significant Labs:  None    Significant Imaging:  Imaging results within the past 24 hours have been reviewed.    Assessment/Plan:   67y F w/pmhx of PALB2, Stage III Colon adenocarcinoma s/p Resection (L sebastián), and Invasive ductal carcinoma of the L breast presenting for colonoscopy.    - Prep complete. To endoscopy.       Byron Diaz MD  Gastroenterology  [facility]

## 2023-09-28 NOTE — DISCHARGE SUMMARY
[facility]  Discharge Note  Short Stay    Procedure(s) (LRB):  COLONOSCOPY (N/A)  Procedure of colonoscopy was explained to the patient and consent obtained.  The patient was transferred to the endoscopy suite, general IV anesthesia was provided by anesthesia Services.  Rectal exam was performed and normal.  The Olympus videocolonoscope was introduced per rectum and advanced up the proximal 30 cm were the sigmoid colon was terminated.  The endoscope was then withdrawn a short distance and an end-to-side colocolostomy was identified, widely patent and normal.  The remainder of the colon was then examined to the cecum.  The ileocecal valve and appendiceal orifice were identified and normal.  The cecal mucosa was normal.  Careful examination of the ascending, transverse, descending and residual sigmoid colon also unremarkable other than a few scattered diverticula.  Retroflexed view of the rectum revealed no abnormalities.  The endoscope was withdrawn.  Withdrawal time cecum to rectum 22 minutes.  The procedure was well tolerated and the patient returned to the recovery area for observation.      Discharge plan-the patient will resume her regular diet today and normal activities tomorrow.  A follow-up colonoscopy in 3 years is recommended.    OUTCOME: Patient tolerated treatment/procedure well without complication and is now ready for discharge.    DISPOSITION: Home or Self Care    FINAL DIAGNOSIS:  <principal problem not specified>    FOLLOWUP: With primary care provider    DISCHARGE INSTRUCTIONS:    Discharge Procedure Orders   Diet general     Call MD for:  temperature >100.4     Call MD for:  persistent nausea and vomiting     Call MD for:  severe uncontrolled pain     Call MD for:  difficulty breathing, headache or visual disturbances     Activity as tolerated        TIME SPENT ON DISCHARGE: 10 minutes

## 2023-09-29 VITALS
SYSTOLIC BLOOD PRESSURE: 147 MMHG | HEART RATE: 69 BPM | HEIGHT: 67 IN | BODY MASS INDEX: 37.04 KG/M2 | DIASTOLIC BLOOD PRESSURE: 92 MMHG | RESPIRATION RATE: 18 BRPM | WEIGHT: 236 LBS | OXYGEN SATURATION: 96 % | TEMPERATURE: 99 F

## 2023-10-10 DIAGNOSIS — C50.912 INFILTRATING DUCTAL CARCINOMA OF LEFT BREAST, ESTROGEN RECEPTOR POSITIVE, STAGE 1: Primary | ICD-10-CM

## 2023-10-10 DIAGNOSIS — Z17.0 INFILTRATING DUCTAL CARCINOMA OF LEFT BREAST, ESTROGEN RECEPTOR POSITIVE, STAGE 1: Primary | ICD-10-CM

## 2023-10-10 DIAGNOSIS — Z85.3 HISTORY OF BREAST CANCER: ICD-10-CM

## 2023-12-05 ENCOUNTER — HOSPITAL ENCOUNTER (OUTPATIENT)
Dept: RADIOLOGY | Facility: HOSPITAL | Age: 68
Discharge: HOME | End: 2023-12-05
Attending: STUDENT IN AN ORGANIZED HEALTH CARE EDUCATION/TRAINING PROGRAM
Payer: COMMERCIAL

## 2023-12-05 DIAGNOSIS — Z15.89 MONOALLELIC MUTATION OF PALB2 GENE: ICD-10-CM

## 2023-12-05 DIAGNOSIS — Z15.09 MONOALLELIC MUTATION OF PALB2 GENE: ICD-10-CM

## 2023-12-05 DIAGNOSIS — Z15.01 MONOALLELIC MUTATION OF PALB2 GENE: ICD-10-CM

## 2023-12-05 PROCEDURE — 76856 US EXAM PELVIC COMPLETE: CPT | Mod: TC

## 2023-12-07 DIAGNOSIS — H10.9 CONJUNCTIVITIS, UNSPECIFIED CONJUNCTIVITIS TYPE, UNSPECIFIED LATERALITY: Primary | ICD-10-CM

## 2023-12-13 ENCOUNTER — LAB VISIT (OUTPATIENT)
Dept: LAB | Facility: HOSPITAL | Age: 68
End: 2023-12-13
Attending: STUDENT IN AN ORGANIZED HEALTH CARE EDUCATION/TRAINING PROGRAM
Payer: COMMERCIAL

## 2023-12-13 ENCOUNTER — OFFICE VISIT (OUTPATIENT)
Dept: OPHTHALMOLOGY | Facility: CLINIC | Age: 68
End: 2023-12-13
Payer: COMMERCIAL

## 2023-12-13 ENCOUNTER — OFFICE VISIT (OUTPATIENT)
Dept: GYNECOLOGY | Facility: CLINIC | Age: 68
End: 2023-12-13
Attending: INTERNAL MEDICINE
Payer: COMMERCIAL

## 2023-12-13 VITALS
HEART RATE: 75 BPM | HEIGHT: 67 IN | RESPIRATION RATE: 22 BRPM | BODY MASS INDEX: 37.35 KG/M2 | WEIGHT: 238 LBS | TEMPERATURE: 98 F | OXYGEN SATURATION: 96 % | SYSTOLIC BLOOD PRESSURE: 102 MMHG | DIASTOLIC BLOOD PRESSURE: 68 MMHG

## 2023-12-13 VITALS — WEIGHT: 238 LBS | HEIGHT: 67 IN | BODY MASS INDEX: 37.35 KG/M2

## 2023-12-13 DIAGNOSIS — H25.13 AGE-RELATED NUCLEAR CATARACT OF BOTH EYES: Primary | ICD-10-CM

## 2023-12-13 DIAGNOSIS — H10.9 CONJUNCTIVITIS, UNSPECIFIED CONJUNCTIVITIS TYPE, UNSPECIFIED LATERALITY: ICD-10-CM

## 2023-12-13 DIAGNOSIS — Z91.89 HIGH RISK OF OVARIAN CANCER: Primary | ICD-10-CM

## 2023-12-13 DIAGNOSIS — I10 HYPERTENSION, UNSPECIFIED TYPE: ICD-10-CM

## 2023-12-13 DIAGNOSIS — C50.912 INFILTRATING DUCTAL CARCINOMA OF LEFT BREAST, ESTROGEN RECEPTOR POSITIVE, STAGE 1: ICD-10-CM

## 2023-12-13 DIAGNOSIS — Z17.0 INFILTRATING DUCTAL CARCINOMA OF LEFT BREAST, ESTROGEN RECEPTOR POSITIVE, STAGE 1: ICD-10-CM

## 2023-12-13 DIAGNOSIS — H35.033 HYPERTENSIVE RETINOPATHY OF BOTH EYES, GRADE 1: ICD-10-CM

## 2023-12-13 LAB — CANCER AG125 SERPL-ACNC: 21.6 UNIT/ML (ref 0–35)

## 2023-12-13 PROCEDURE — 92134 CPTRZ OPH DX IMG PST SGM RTA: CPT | Mod: PBBFAC,PN | Performed by: OPHTHALMOLOGY

## 2023-12-13 PROCEDURE — 99214 OFFICE O/P EST MOD 30 MIN: CPT | Mod: PBBFAC,25,PN | Performed by: STUDENT IN AN ORGANIZED HEALTH CARE EDUCATION/TRAINING PROGRAM

## 2023-12-13 PROCEDURE — 36415 COLL VENOUS BLD VENIPUNCTURE: CPT

## 2023-12-13 PROCEDURE — 92134 CPTRZ OPH DX IMG PST SGM RTA: CPT | Mod: PBBFAC,PN | Performed by: STUDENT IN AN ORGANIZED HEALTH CARE EDUCATION/TRAINING PROGRAM

## 2023-12-13 PROCEDURE — 86304 IMMUNOASSAY TUMOR CA 125: CPT

## 2023-12-13 PROCEDURE — 99215 OFFICE O/P EST HI 40 MIN: CPT | Mod: PBBFAC,27

## 2023-12-13 RX ORDER — ZINC GLUCONATE 50 MG
TABLET ORAL
COMMUNITY

## 2023-12-13 RX ORDER — TEZEPELUMAB-EKKO 210 MG/1.9ML
INJECTION, SOLUTION SUBCUTANEOUS
COMMUNITY
Start: 2023-04-04

## 2023-12-13 RX ORDER — KETOROLAC TROMETHAMINE 10 MG/1
TABLET, FILM COATED ORAL
COMMUNITY

## 2023-12-13 RX ORDER — CIPROFLOXACIN HYDROCHLORIDE 3 MG/ML
1 SOLUTION/ DROPS OPHTHALMIC 3 TIMES DAILY
COMMUNITY
Start: 2023-09-27

## 2023-12-13 RX ORDER — MULTIVIT-MIN/IRON/FOLIC ACID/K 18-600-40
CAPSULE ORAL
COMMUNITY

## 2023-12-13 RX ORDER — BECLOMETHASONE DIPROPIONATE 80 UG/1
AEROSOL, METERED NASAL
COMMUNITY

## 2023-12-13 NOTE — PROGRESS NOTES
HPI    Patient would like an updated MRX today if possible, she states that she   is here for a check up.   Last edited by Teresa Salinas MA on 12/13/2023  1:40 PM.        OCT Macula 12/13/23  OD: 245, NFC, no IRF/SRF  OS: 244, NFC, no IRF/SRF     OCT Nerve 12/13/23  OD: 91, all green   OS: 93, all green    Assessment /Plan     For exam results, see Encounter Report.    Age-related nuclear cataract of both eyes    Conjunctivitis, unspecified conjunctivitis type, unspecified laterality  -     Ambulatory referral/consult to Ophthalmology    Hypertension, unspecified type      1) Age Related Cataracts, OU  - BCVA 20/20 // 20/25  - MRX provided 12/13/23    2) Grade I HTN Retinopathy, OU  - CTM annual DFE

## 2023-12-13 NOTE — PROGRESS NOTES
[facility] OB/GYN CLINIC NOTE  OU  [address] [place] [address]  Phone: [phone]  Fax: [phone]    Subjective:     Anny Ferreira is a 68 y.o.  who presents for surveillance for increased risk of ovarian cancer in the setting of PALB2 mutation.    Patient with PALB2 mutation, with notable personal h/o breast cancer in  s/p left mastectomy as well as colon cancer in  s/p colon resection and chemotherapy, currently in remission for both cancers. Patient has been counseled about high risk of ovarian cancer and offered rrBSO, declined. Opted for TVUS and CA-125 every 6 months for surveillance.    Today she reports:  - No complaints. Denies pelvic/abdominal pain, vaginal bleeding/discharge.  - Continues to decline rrBSO, understands her high risk for ovarian cancer.    MedHx:  Past Medical History:   Diagnosis Date    Asthma     Breast cancer     Claustrophobia     Colon cancer 2008    colon    Essential (primary) hypertension     GERD (gastroesophageal reflux disease)     Hayfever     Hyperlipidemia     Mental disorder     Obese        SurgHx:  Past Surgical History:   Procedure Laterality Date     SECTION      30years ago     COLON RESECTION      COLON SURGERY      COLONOSCOPY      COLONOSCOPY N/A 2023    Procedure: COLONOSCOPY;  Surgeon: Raffy Osorio MD;  Location: [facility] ENDOSCOPY;  Service: Endoscopy;  Laterality: N/A;    MASTECTOMY Left     MOUTH SURGERY      NOSE SURGERY      rotater cuff Right 2006    TUBAL LIGATION         Medications:    Current Outpatient Medications:     ciprofloxacin HCl (CILOXAN) 0.3 % ophthalmic solution, Place 1 drop into both eyes 3 (three) times daily., Disp: , Rfl:     tezepelumab-ekko (TEZSPIRE) 210 mg/1.91 mL (110 mg/mL) Syrg, as directed Subcutaneous, Disp: , Rfl:     albuterol (PROVENTIL/VENTOLIN HFA) 90 mcg/actuation inhaler, Inhale into the lungs., Disp: , Rfl:     albuterol-ipratropium (DUO-NEB) 2.5 mg-0.5 mg/3 mL  nebulizer solution, , Disp: , Rfl:     ALPRAZolam (XANAX) 0.5 MG tablet, , Disp: , Rfl:     ascorbic acid, vitamin C, 500 mg Cap, as directed Orally, Disp: , Rfl:     beclomethasone dipropionate (QNASL) 80 mcg/actuation HFAA, 2 sprays in each nostril Nasally Once a day for 30 day(s), Disp: , Rfl:     benzonatate (TESSALON) 100 MG capsule, Take 100 mg by mouth., Disp: , Rfl:     cetirizine (ZYRTEC) 10 MG tablet, Take 10 mg by mouth., Disp: , Rfl:     clotrimazole (LOTRIMIN) 1 % cream, Apply topically 2 (two) times daily. For 30 days, Disp: 45 g, Rfl: 3    COMP-AIR NEBULIZER COMPRESSOR Neli, use as directed, Disp: , Rfl:     esomeprazole (NEXIUM) 40 MG capsule, Take 40 mg by mouth before breakfast., Disp: , Rfl:     fluticasone-umeclidin-vilanter (TRELEGY ELLIPTA) 200-62.5-25 mcg inhaler, Inhale 1 puff into the lungs once daily., Disp: , Rfl:     hydroCHLOROthiazide (MICROZIDE) 12.5 mg capsule, Take 12.5 mg by mouth once daily. Patient states she takes as needed., Disp: , Rfl:     ketorolac (TORADOL) 10 mg tablet, 1 tablet with food or milk as needed Orally every 6 hrs for 5 day(s), Disp: , Rfl:     levocetirizine (XYZAL) 5 MG tablet, , Disp: , Rfl:     montelukast (SINGULAIR) 10 mg tablet, Take 10 mg by mouth., Disp: , Rfl:     phenylephrine HCL 0.5% (CORTNEY-SYNEPHRINE) 0.5 % nasal spray, Afrin Allergy, Disp: , Rfl:     pravastatin (PRAVACHOL) 40 MG tablet, Take 40 mg by mouth., Disp: , Rfl:     zinc gluconate 50 mg tablet, 1 tablet Orally Once a day for 30 day(s), Disp: , Rfl:     FM Hx:  Family History   Problem Relation Age of Onset    Diabetes Mother     Hypertension Mother     Cancer Father     Cancer Sister        Social Hx:  Social History     Socioeconomic History    Marital status:    Occupational History    Occupation: [employer]     Employer: [employer]   Tobacco Use    Smoking status: Never    Smokeless tobacco: Never   Substance and Sexual Activity    Alcohol use: Not Currently     Comment:  "rarely     Drug use: No    Sexual activity: Not Currently       Review of Systems  Denies recent weight loss, night sweats, fevers, chills, headache, blurry vision, nausea, vomiting, dizziness, or syncope.  Denies chest pain, shortness of breath, RUQ pain, or calf pain.    Objective:     Vitals:    23 1051   BP: 102/68   Pulse: 75   Resp: (!) 22   Temp: 98.1 °F (36.7 °C)   SpO2: 96%   Weight: 108 kg (238 lb)   Height: 5' 7" (1.702 m)     Body mass index is 37.28 kg/m².    Physical Exam:    General: Alert and oriented, in no acute distress.  Lungs: Clear to auscultation bilaterally, no conversational dyspnea.  Heart: Regular rate and rhythm.  Abdomen: Soft, non-distended, non tender to palpation, no involuntary guarding, no rebound tenderness.  Pelvic Exam: deferred by patient.    Imaging  TVUS on 2023:  Narrative & Impression  EXAMINATION:  US PELVIS COMP WITH TRANSVAG NON-OB (XPD)     CLINICAL HISTORY:  surveillance;  Genetic susceptibility to malignant neoplasm of breast     TECHNIQUE:  Transabdominal and transvaginal images were obtained     COMPARISON:  2023     FINDINGS:  Uterus measures 9.0 x 3.1 x 3.8 cm.  Endometrium measures 0.5 cm.  Multiple nabothian cysts are present.     Ovaries are not identified.  No adnexal masses or free fluid.     Impression:     1. Ovaries are not identified.  No adnexal mass is present.  2. Nabothian cysts.        Electronically signed by: Flaco Mendosa MD  Date:                                            2023  Time:                                           18:14     Assessment/Plan:    Anny Ferreira is a 68 y.o.  presenting for surveillance for increased risk of ovarian cancer in the setting of PALB2 mutation.    - TVUS reviewed, wnl, no masses appreciated.  - CA-125: 21.6 today, wnl.  - Deferred pelvic exam today.  - Will continue TVUS and CA-125 every 6 months, orders placed for patient to have TVUS and CA-125 prior to next visit.  - RTC in 6 " months.    Future Appointments   Date Time Provider Department Center   4/5/2024  8:30 AM Latia Montgomery, [facility] GYN [facility]   6/12/2024  8:45 AM RESIDENTS, [facility]   12/17/2024 10:00 AM PROVIDERS, [facility]     Patient and plan were discussed with Dr. Diaz.    Sancho Morton MD  [employer] [facility], PGY-2

## 2023-12-14 PROBLEM — H35.033 HYPERTENSIVE RETINOPATHY OF BOTH EYES, GRADE 1: Status: ACTIVE | Noted: 2023-12-14

## 2024-02-27 DIAGNOSIS — E07.9 DISORDER OF THYROID: Primary | ICD-10-CM

## 2024-02-28 ENCOUNTER — HOSPITAL ENCOUNTER (OUTPATIENT)
Dept: RADIOLOGY | Facility: HOSPITAL | Age: 69
Discharge: HOME | End: 2024-02-28
Attending: FAMILY MEDICINE
Payer: COMMERCIAL

## 2024-02-28 DIAGNOSIS — E07.9 DISORDER OF THYROID: ICD-10-CM

## 2024-02-28 PROCEDURE — 76536 US EXAM OF HEAD AND NECK: CPT | Mod: TC

## 2024-06-10 ENCOUNTER — HOSPITAL ENCOUNTER (OUTPATIENT)
Dept: RADIOLOGY | Facility: HOSPITAL | Age: 69
Discharge: HOME | End: 2024-06-10
Attending: STUDENT IN AN ORGANIZED HEALTH CARE EDUCATION/TRAINING PROGRAM
Payer: COMMERCIAL

## 2024-06-10 DIAGNOSIS — Z17.0 INFILTRATING DUCTAL CARCINOMA OF LEFT BREAST, ESTROGEN RECEPTOR POSITIVE, STAGE 1: ICD-10-CM

## 2024-06-10 DIAGNOSIS — C50.912 INFILTRATING DUCTAL CARCINOMA OF LEFT BREAST, ESTROGEN RECEPTOR POSITIVE, STAGE 1: ICD-10-CM

## 2024-06-10 PROCEDURE — 76856 US EXAM PELVIC COMPLETE: CPT | Mod: TC

## 2024-06-10 PROCEDURE — 76830 TRANSVAGINAL US NON-OB: CPT | Mod: TC

## 2024-11-06 ENCOUNTER — LAB VISIT (OUTPATIENT)
Dept: LAB | Facility: HOSPITAL | Age: 69
End: 2024-11-06
Payer: COMMERCIAL

## 2024-11-06 ENCOUNTER — OFFICE VISIT (OUTPATIENT)
Dept: GYNECOLOGY | Facility: CLINIC | Age: 69
End: 2024-11-06
Payer: COMMERCIAL

## 2024-11-06 VITALS
HEART RATE: 74 BPM | OXYGEN SATURATION: 96 % | SYSTOLIC BLOOD PRESSURE: 131 MMHG | RESPIRATION RATE: 18 BRPM | WEIGHT: 240 LBS | BODY MASS INDEX: 37.67 KG/M2 | HEIGHT: 67 IN | DIASTOLIC BLOOD PRESSURE: 77 MMHG | TEMPERATURE: 98 F

## 2024-11-06 DIAGNOSIS — Z91.89 HIGH RISK OF OVARIAN CANCER: ICD-10-CM

## 2024-11-06 DIAGNOSIS — R97.1 ELEVATED CANCER ANTIGEN 125 (CA 125): ICD-10-CM

## 2024-11-06 DIAGNOSIS — Z91.89 HIGH RISK OF OVARIAN CANCER: Primary | ICD-10-CM

## 2024-11-06 LAB — CANCER AG125 SERPL-ACNC: 22 UNIT/ML (ref 0–35)

## 2024-11-06 PROCEDURE — 36415 COLL VENOUS BLD VENIPUNCTURE: CPT

## 2024-11-06 PROCEDURE — 99214 OFFICE O/P EST MOD 30 MIN: CPT | Mod: PBBFAC

## 2024-11-06 PROCEDURE — 86304 IMMUNOASSAY TUMOR CA 125: CPT

## 2024-11-06 RX ORDER — CLOTRIMAZOLE 1 %
CREAM (GRAM) TOPICAL 2 TIMES DAILY
Qty: 24 G | Refills: 1 | Status: SHIPPED | OUTPATIENT
Start: 2024-11-06 | End: 2024-12-06

## 2024-11-06 NOTE — PROGRESS NOTES
[facility] OB/GYN CLINIC NOTE  OU  [address] [address]  Phone: [phone]  Fax: [phone]    Subjective:     Anny Ferreira is a 69 y.o.  who presents for surveillance for increased risk of ovarian cancer in the setting of PALB2 mutation.     Patient with PALB2 mutation, with notable personal h/o breast cancer in  s/p left mastectomy as well as colon cancer in  s/p colon resection and chemotherapy, currently in remission for both cancers. Patient has been counseled about high risk of ovarian cancer and offered rrBSO, declined. Opted for TVUS and CA-125 every 6 months for surveillance.     Today she reports:  - No complaints. Denies pelvic/abdominal pain, vaginal bleeding/discharge.  - Continues to decline rrBSO, understands her high risk for ovarian cancer    MedHx:   Past Medical History:   Diagnosis Date    Asthma     Breast cancer     Claustrophobia     Colon cancer 2008    colon    Essential (primary) hypertension     GERD (gastroesophageal reflux disease)     Hayfever     Hyperlipidemia     Mental disorder     Obese      SurgHx:   Past Surgical History:   Procedure Laterality Date     SECTION      30years ago     COLON RESECTION      COLON SURGERY      COLONOSCOPY      COLONOSCOPY N/A 2023    Procedure: COLONOSCOPY;  Surgeon: Raffy Osorio MD;  Location: [facility] ENDOSCOPY;  Service: Endoscopy;  Laterality: N/A;    MASTECTOMY Left     MOUTH SURGERY      NOSE SURGERY      rotater cuff Right 2006    TUBAL LIGATION       Medications:    Current Outpatient Medications:     albuterol (PROVENTIL/VENTOLIN HFA) 90 mcg/actuation inhaler, Inhale into the lungs., Disp: , Rfl:     albuterol-ipratropium (DUO-NEB) 2.5 mg-0.5 mg/3 mL nebulizer solution, , Disp: , Rfl:     ALPRAZolam (XANAX) 0.5 MG tablet, , Disp: , Rfl:     beclomethasone dipropionate (QNASL) 80 mcg/actuation HFAA, 2 sprays in each nostril Nasally Once a day for 30 day(s), Disp: , Rfl:      ciprofloxacin HCl (CILOXAN) 0.3 % ophthalmic solution, Place 1 drop into both eyes 3 (three) times daily., Disp: , Rfl:     COMP-AIR NEBULIZER COMPRESSOR Neli, use as directed, Disp: , Rfl:     esomeprazole (NEXIUM) 40 MG capsule, Take 40 mg by mouth before breakfast., Disp: , Rfl:     fluticasone-umeclidin-vilanter (TRELEGY ELLIPTA) 200-62.5-25 mcg inhaler, Inhale 1 puff into the lungs once daily., Disp: , Rfl:     levocetirizine (XYZAL) 5 MG tablet, , Disp: , Rfl:     pravastatin (PRAVACHOL) 40 MG tablet, Take 40 mg by mouth., Disp: , Rfl:     tezepelumab-ekko (TEZSPIRE) 210 mg/1.91 mL (110 mg/mL) Syrg, as directed Subcutaneous, Disp: , Rfl:     ascorbic acid, vitamin C, 500 mg Cap, as directed Orally (Patient not taking: Reported on 11/6/2024), Disp: , Rfl:     benzonatate (TESSALON) 100 MG capsule, Take 100 mg by mouth. (Patient not taking: Reported on 11/6/2024), Disp: , Rfl:     cetirizine (ZYRTEC) 10 MG tablet, Take 10 mg by mouth. (Patient not taking: Reported on 11/6/2024), Disp: , Rfl:     clotrimazole (LOTRIMIN) 1 % cream, Apply topically 2 (two) times daily. For 30 days, Disp: 24 g, Rfl: 1    hydroCHLOROthiazide (MICROZIDE) 12.5 mg capsule, Take 12.5 mg by mouth once daily. Patient states she takes as needed. (Patient not taking: Reported on 11/6/2024), Disp: , Rfl:     ketorolac (TORADOL) 10 mg tablet, 1 tablet with food or milk as needed Orally every 6 hrs for 5 day(s) (Patient not taking: Reported on 11/6/2024), Disp: , Rfl:     montelukast (SINGULAIR) 10 mg tablet, Take 10 mg by mouth. (Patient not taking: Reported on 11/6/2024), Disp: , Rfl:     phenylephrine HCL 0.5% (CORTNEY-SYNEPHRINE) 0.5 % nasal spray, Afrin Allergy (Patient not taking: Reported on 11/6/2024), Disp: , Rfl:     zinc gluconate 50 mg tablet, 1 tablet Orally Once a day for 30 day(s) (Patient not taking: Reported on 11/6/2024), Disp: , Rfl:     Family History   Problem Relation Name Age of Onset    Diabetes Mother      Hypertension  "Mother      Cancer Father      Cancer Sister       Social Hx: Denies tobacco, alcohol and illicit drug usage.  Social History     Socioeconomic History    Marital status:    Occupational History    Occupation: [employer]     Employer: [employer]   Tobacco Use    Smoking status: Never    Smokeless tobacco: Never   Substance and Sexual Activity    Alcohol use: Not Currently     Comment: rarely     Drug use: No    Sexual activity: Not Currently       Review of Systems  Denies fevers, chills, headache, blurry vision, nausea, vomiting, dizziness, or syncope.   Denies chest pain, shortness of breath, RUQ pain, or calf pain.    Objective:     Vitals:    24 1015   BP: 131/77   BP Location: Left arm   Patient Position: Sitting   Pulse: 74   Resp: 18   Temp: 98.2 °F (36.8 °C)   TempSrc: Oral   SpO2: 96%   Weight: 108.9 kg (240 lb)   Height: 5' 7" (1.702 m)     Body mass index is 37.59 kg/m².    Physical Exam:   General: Alert and oriented, in no acute distress.  Lungs: Clear to auscultation bilaterally, no conversational dyspnea.  Heart: Regular rate and rhythm.  Abdomen: Soft, non-distended, non tender to palpation, no involuntary guarding, no rebound tenderness.  Pelvic Exam: deferred by patient today.     Imaging  Last transvag pelvis US on 06/10/2024    Assessment/Plan:    Anny Ferreira is a 68 y.o.  presenting for surveillance for increased risk of ovarian cancer in the setting of PALB2 mutation.     - Ordered repeat TVUS and  today.  - last US 06/10/2024 reviewed, wnl, no masses appreciated.  - CA-125: 21.6 on 2023; CA-125: 22 today  - Deferred pelvic exam today.  - Will continue TVUS and CA-125 yearly  - RTC in 1 year or sooner if needed    Future Appointments   Date Time Provider Department Center   2024 10:00 AM PROVIDERS, [facility]   11/10/2025  8:30 AM RESIDENTS, [facility] GYN [facility]     Patient and plan were discussed with Dr. Diaz. "     Tima Rodriguez MD  Family Medicine Resident

## 2024-11-07 ENCOUNTER — PATIENT MESSAGE (OUTPATIENT)
Dept: GYNECOLOGY | Facility: CLINIC | Age: 69
End: 2024-11-07
Payer: COMMERCIAL

## 2024-12-02 ENCOUNTER — HOSPITAL ENCOUNTER (OUTPATIENT)
Dept: RADIOLOGY | Facility: HOSPITAL | Age: 69
Discharge: HOME | End: 2024-12-02
Payer: COMMERCIAL

## 2024-12-02 DIAGNOSIS — Z91.89 HIGH RISK OF OVARIAN CANCER: ICD-10-CM

## 2024-12-02 PROCEDURE — 76856 US EXAM PELVIC COMPLETE: CPT | Mod: TC

## 2024-12-03 ENCOUNTER — PATIENT MESSAGE (OUTPATIENT)
Dept: GYNECOLOGY | Facility: CLINIC | Age: 69
End: 2024-12-03
Payer: COMMERCIAL

## 2025-03-11 ENCOUNTER — TELEPHONE (OUTPATIENT)
Dept: GYNECOLOGY | Facility: CLINIC | Age: 70
End: 2025-03-11
Payer: COMMERCIAL

## 2025-03-11 NOTE — TELEPHONE ENCOUNTER
-called patient and made appt for this Friday, March3/14 for 9:30am ---- Message from Viola sent at 3/10/2025  9:50 AM CDT -----  Regarding: Pt Concerns  Pt having issues with burning in hua area. Pt has taken medication for both a yeast infection and bladder infection and is currently taking a toe fungus medication but has had no relief and has been going on for about 3 weeks now. She was advised by her PCP to see her gyn. Please advise.Thank you!

## 2025-03-14 ENCOUNTER — OFFICE VISIT (OUTPATIENT)
Dept: GYNECOLOGY | Facility: CLINIC | Age: 70
End: 2025-03-14
Payer: COMMERCIAL

## 2025-03-14 VITALS
HEART RATE: 82 BPM | TEMPERATURE: 98 F | HEIGHT: 67 IN | RESPIRATION RATE: 18 BRPM | DIASTOLIC BLOOD PRESSURE: 80 MMHG | BODY MASS INDEX: 37.04 KG/M2 | SYSTOLIC BLOOD PRESSURE: 125 MMHG | WEIGHT: 236 LBS | OXYGEN SATURATION: 95 %

## 2025-03-14 DIAGNOSIS — N94.89 VAGINAL BURNING: Primary | ICD-10-CM

## 2025-03-14 LAB
BACTERIA #/AREA URNS AUTO: ABNORMAL /HPF
BILIRUB UR QL STRIP.AUTO: NEGATIVE
CLARITY UR: CLEAR
CLUE CELLS VAG QL WET PREP: ABNORMAL
COLOR UR AUTO: YELLOW
GLUCOSE UR QL STRIP: NORMAL
HGB UR QL STRIP: NEGATIVE
HYALINE CASTS #/AREA URNS LPF: ABNORMAL /LPF
KETONES UR QL STRIP: NEGATIVE
LEUKOCYTE ESTERASE UR QL STRIP: 25
NITRITE UR QL STRIP: NEGATIVE
PH UR STRIP: 5.5 [PH]
PROT UR QL STRIP: NEGATIVE
RBC #/AREA URNS AUTO: ABNORMAL /HPF
SP GR UR STRIP.AUTO: 1.02 (ref 1–1.03)
SQUAMOUS #/AREA URNS LPF: ABNORMAL /HPF
T VAGINALIS VAG QL WET PREP: ABNORMAL
UROBILINOGEN UR STRIP-ACNC: NORMAL
WBC #/AREA URNS AUTO: ABNORMAL /HPF
WBC #/AREA VAG WET PREP: ABNORMAL
YEAST SPEC QL WET PREP: ABNORMAL

## 2025-03-14 PROCEDURE — 99214 OFFICE O/P EST MOD 30 MIN: CPT | Mod: PBBFAC

## 2025-03-14 PROCEDURE — 81001 URINALYSIS AUTO W/SCOPE: CPT

## 2025-03-14 PROCEDURE — 87210 SMEAR WET MOUNT SALINE/INK: CPT

## 2025-03-14 RX ORDER — CLOBETASOL PROPIONATE 0.5 MG/G
OINTMENT TOPICAL
Qty: 30 G | Refills: 1 | Status: SHIPPED | OUTPATIENT
Start: 2025-03-14

## 2025-03-14 RX ORDER — NITROFURANTOIN 25; 75 MG/1; MG/1
100 CAPSULE ORAL 2 TIMES DAILY
COMMUNITY
Start: 2025-02-27

## 2025-03-14 RX ORDER — CLOBETASOL PROPIONATE 0.5 MG/G
OINTMENT TOPICAL
Qty: 30 G | Refills: 1 | Status: SHIPPED | OUTPATIENT
Start: 2025-03-14 | End: 2025-03-14

## 2025-03-14 RX ORDER — TERBINAFINE HYDROCHLORIDE 250 MG/1
250 TABLET ORAL
COMMUNITY
Start: 2025-01-13

## 2025-03-14 NOTE — PROGRESS NOTES
U GYNECOLOGY CLINIC NOTE  [facility]  [facility]  [address] [address]  Phone: [phone]  Fax: [phone]    Subjective:     HPI:  Anny Ferreira is a 69 y.o.  who presents complaining of burning in perineal area for three weeks.    Today she reports:  -  Burning first began two months ago associated with thick white discharge, states she underwent UA and was told she had a UTI and a yeast infection, states she took an antifungal and nitrofurantoin with resolution in her symptoms. States that the burning sensation came back three weeks ago. States it is not related to urination. Denies vaginal bleeding, dysuria, or increased urinary frequency. Denies hx of STI. Denies fever, chills nausea, vomiting.    Notably, patient has a hx pf PALB2 mutation, with notable personal h/o breast cancer in  s/p left mastectomy as well as colon cancer in  s/p colon resection and chemotherapy, currently in remission for both cancers. Patient has been counseled about high risk of ovarian cancer and offered rrBSO, declined. Opted for TVUS and CA-125 every 6 months for surveillance.    Past Medical History:  Past Medical History:   Diagnosis Date    Asthma     Breast cancer     Claustrophobia     Colon cancer 2008    colon    Essential (primary) hypertension     GERD (gastroesophageal reflux disease)     Hayfever     Hyperlipidemia     Mental disorder     Obese        Past Surgical History:  Past Surgical History:   Procedure Laterality Date     SECTION      30years ago     COLON RESECTION      COLON SURGERY      COLONOSCOPY      COLONOSCOPY N/A 2023    Procedure: COLONOSCOPY;  Surgeon: Raffy Osorio MD;  Location: [facility] ENDOSCOPY;  Service: Endoscopy;  Laterality: N/A;    MASTECTOMY Left     MOUTH SURGERY      NOSE SURGERY      rotater cuff Right 2006    TUBAL LIGATION         Gynecologic History:  Menarche @ age 14, regular  "cyclic heavy menses, 7 days of flow,  painful until menopause @ age 49.  LMP: age 49  Hx of STDs: no.  Hx of abnormal pap: no.  Sexually active: no  Contraception: N/a.    Obstetric History:   OB History    Para Term  AB Living   3 3 3 0 0 3   SAB IAB Ectopic Multiple Live Births             # Outcome Date GA Lbr Vic/2nd Weight Sex Type Anes PTL Lv   3 Term      CS-Unspec      2 Term      Vag-Spont      1 Term      Vag-Spont          Family History:   Family History   Problem Relation Name Age of Onset    Diabetes Mother      Hypertension Mother      Cancer Father      Cancer Sister         Social History:  Denies tobacco, alcohol, and illicit drug use.  Social History[1]    Allergies:  Review of patient's allergies indicates:   Allergen Reactions    Adhesive Blisters     tegaderm    Morphine     Aspirin     Lortab [hydrocodone-acetaminophen] Other (See Comments)     hallucinations            Tramadol      itching       Medications:  Current Medications[2]    Review of Systems:  Denies fevers, chills, headache, blurry vision, nausea, vomiting, dizziness, or syncope.  Denies chest pain, shortness of breath, RUQ pain, or calf pain.  Denies night sweats, unexplained weight loss, or new onset skin rashes.  Negative unless noted in HPI.    Objective:     Vitals:    25 0903   BP: 125/80   BP Location: Right arm   Patient Position: Sitting   Pulse: 82   Resp: 18   Temp: 98.3 °F (36.8 °C)   TempSrc: Oral   SpO2: 95%   Weight: 107 kg (236 lb)   Height: 5' 7" (1.702 m)     Body mass index is 36.96 kg/m².    Physical Exam:  General: Alert and oriented, in no acute distress.  Lungs: No conversational dyspnea, no respiratory distress, no tachypnea. CTAB.  Heart: Regular rate  Breasts: deferred  Abdomen: Soft, non-distended, mildly TTP in lower abdomen  Extremities: No cords or calf tenderness.  External genitalia: Normal female genitalia without lesion. Thick white cottage cheese appearing discharge. Normal " appearing urethral meatus. Normal appearing external anus. Rash to posterior buttocks with mild skin atrophy  Speculum Exam: Vaginal mucosa normal in appearance, no lesions. Mild thick discharge. Pink with normal rugae. os normal in appearance, no blood or discharge coming from the os.     Note: Female RN chaperone present for entirety of exam.     Imaging  No images are attached to the encounter.  Assessment/Plan:    Anny Ferreira is a 69 y.o.  who presents complaining of burning in perineal area for three weeks. Patient continues to have burning in her perineal area extending to her buttocks likely 2/2 lichen sclerosis vs recurrent yeast infection. UA with reflex to culture and wet prep performed in clinic today to rule out infectious process.    Will prescribe topical clobetasol for treatment with instructions to apply 2-3 times weekly. Will call with wet prep results. Patient counseled on the importance of keeping the area dry.  Health maintenance:  Pap not indicated  Mammogram yearly, due in November   Colonoscopy UTD.    RTC in November for annual exam and repeat CA-125 and TVUS.    Future Appointments   Date Time Provider Department Center   [facility] 10[facility]    11/10/2025  8:30 AM RESIDENTS, [facility]        Patient and plan were discussed with Dr. Dodd.    Cabrera Lund MS3      Brad Villanueva MD  [facility] Family [facility] HO-2           [1]   Social History  Socioeconomic History    Marital status:    Occupational History    Occupation: [employer]     Employer: [employer]   Tobacco Use    Smoking status: Never    Smokeless tobacco: Never   Substance and Sexual Activity    Alcohol use: Not Currently     Comment: rarely     Drug use: No    Sexual activity: Not Currently   [2]   Current Outpatient Medications:     albuterol (PROVENTIL/VENTOLIN HFA) 90 mcg/actuation inhaler, Inhale into the lungs., Disp: , Rfl:      albuterol-ipratropium (DUO-NEB) 2.5 mg-0.5 mg/3 mL nebulizer solution, , Disp: , Rfl:     ALPRAZolam (XANAX) 0.5 MG tablet, , Disp: , Rfl:     ascorbic acid, vitamin C, 500 mg Cap, , Disp: , Rfl:     beclomethasone dipropionate (QNASL) 80 mcg/actuation HFAA, 2 sprays in each nostril Nasally Once a day for 30 day(s), Disp: , Rfl:     cetirizine (ZYRTEC) 10 MG tablet, Take 10 mg by mouth., Disp: , Rfl:     ciprofloxacin HCl (CILOXAN) 0.3 % ophthalmic solution, Place 1 drop into both eyes 3 (three) times daily., Disp: , Rfl:     COMP-AIR NEBULIZER COMPRESSOR Neli, use as directed, Disp: , Rfl:     esomeprazole (NEXIUM) 40 MG capsule, Take 40 mg by mouth before breakfast., Disp: , Rfl:     fluticasone-umeclidin-vilanter (TRELEGY ELLIPTA) 200-62.5-25 mcg inhaler, Inhale 1 puff into the lungs once daily., Disp: , Rfl:     levocetirizine (XYZAL) 5 MG tablet, , Disp: , Rfl:     montelukast (SINGULAIR) 10 mg tablet, Take 10 mg by mouth., Disp: , Rfl:     nitrofurantoin, macrocrystal-monohydrate, (MACROBID) 100 MG capsule, Take 100 mg by mouth 2 (two) times daily., Disp: , Rfl:     pravastatin (PRAVACHOL) 40 MG tablet, Take 40 mg by mouth., Disp: , Rfl:     terbinafine HCL (LAMISIL) 250 mg tablet, Take 250 mg by mouth., Disp: , Rfl:     tezepelumab-ekko (TEZSPIRE) 210 mg/1.91 mL (110 mg/mL) Syrg, as directed Subcutaneous, Disp: , Rfl:     zinc gluconate 50 mg tablet, , Disp: , Rfl:     benzonatate (TESSALON) 100 MG capsule, Take 100 mg by mouth. (Patient not taking: Reported on 3/14/2025), Disp: , Rfl:     clobetasol 0.05% (TEMOVATE) 0.05 % Oint, Apply to affected area 2 to 3 times a week, Disp: 30 g, Rfl: 1    clotrimazole (LOTRIMIN) 1 % cream, Apply topically 2 (two) times daily. For 30 days, Disp: 24 g, Rfl: 1    hydroCHLOROthiazide (MICROZIDE) 12.5 mg capsule, Take 12.5 mg by mouth once daily. Patient states she takes as needed. (Patient not taking: Reported on 3/14/2025), Disp: , Rfl:     ketorolac (TORADOL) 10 mg  tablet, 1 tablet with food or milk as needed Orally every 6 hrs for 5 day(s) (Patient not taking: Reported on 3/14/2025), Disp: , Rfl:     phenylephrine HCL 0.5% (CORTNEY-SYNEPHRINE) 0.5 % nasal spray, Afrin Allergy (Patient not taking: Reported on 3/14/2025), Disp: , Rfl:

## 2025-03-21 ENCOUNTER — TELEPHONE (OUTPATIENT)
Dept: GYNECOLOGY | Facility: CLINIC | Age: 70
End: 2025-03-21
Payer: COMMERCIAL

## 2025-03-21 NOTE — TELEPHONE ENCOUNTER
I am not aware of how to order sent to PCP office ----- Message from Isha Vanegas MD sent at 3/19/2025  8:36 AM CDT -----  Regarding: RE: Special order Bra from [place]  I am no longer her primary. I do not have an outpatient practice anymore. This needs to be forwarded to [facility] in [place]. Fax: [phone]  ----- Message -----  From: Angelica Weaver RN  Sent: 3/17/2025   9:44 AM CDT  To: Isha Vanegas MD  Subject: FW: Special order Bra from [place]           Good morning Doc,  this patient is needing a prescription for a specialized bra due to her left mastectomy.  If she had someone here send it I can't find it and I don't know how to send it.  Are you able to get that done for her?  Thanks so much  ----- Message -----  From: Brad Villanueva MD  Sent: 3/14/2025  10:29 AM CDT  To: Angelica Weaver RN  Subject: Special order Bra from [place]               Ms. Dominguez,Dr. Dodd and I saw this patient today in clinic. Patient was asking for a prescription for a special bra because she has had a left mastectomy. She mentioned that she has gotten the prescription from here before, but Dr. Dodd and I weren't sure how to get that prescription done. If you are aware how, would you please assist us with getting this done for the patient. Thank you, we also asked patient to see if her PCP is able to.Brad Villanueva MD

## 2025-06-24 ENCOUNTER — OFFICE VISIT (OUTPATIENT)
Dept: OPHTHALMOLOGY | Facility: CLINIC | Age: 70
End: 2025-06-24
Payer: COMMERCIAL

## 2025-06-24 VITALS — WEIGHT: 235.88 LBS | BODY MASS INDEX: 37.02 KG/M2 | HEIGHT: 67 IN

## 2025-06-24 DIAGNOSIS — H25.13 AGE-RELATED NUCLEAR CATARACT OF BOTH EYES: Primary | ICD-10-CM

## 2025-06-24 DIAGNOSIS — H35.033 HYPERTENSIVE RETINOPATHY OF BOTH EYES, GRADE 1: ICD-10-CM

## 2025-06-24 PROCEDURE — 92134 CPTRZ OPH DX IMG PST SGM RTA: CPT | Mod: PBBFAC,PN | Performed by: OPHTHALMOLOGY

## 2025-06-24 PROCEDURE — 99213 OFFICE O/P EST LOW 20 MIN: CPT | Mod: PBBFAC,PN

## 2025-06-24 NOTE — PROGRESS NOTES
HPI    Patient is here for her annual exam   Last edited by Teresa Salinas MA on 6/24/2025 10:11 AM.        OCT Macula 12/13/23  OD: 245, NFC, no IRF/SRF  OS: 244, NFC, no IRF/SRF     6/24/25  244//245, NFC, no SRF/IRF OU    OCT Nerve 12/13/23  OD: 91, all green   OS: 93, all green    Assessment /Plan     For exam results, see Encounter Report.    Age-related nuclear cataract of both eyes        1) Age Related Cataracts, OU  - BCVA 20/20 // 20/25  - MRX provided 12/13/23    2) Grade I HTN Retinopathy, OU  - CTM annual DFE    RTC 1 year DFE

## (undated) DEVICE — MANIFOLD 4 PORT

## (undated) DEVICE — KIT SURGICAL COLON .25 1.1OZ